# Patient Record
Sex: MALE | Race: WHITE | NOT HISPANIC OR LATINO | Employment: FULL TIME | URBAN - METROPOLITAN AREA
[De-identification: names, ages, dates, MRNs, and addresses within clinical notes are randomized per-mention and may not be internally consistent; named-entity substitution may affect disease eponyms.]

---

## 2021-04-27 ENCOUNTER — OCCUPATIONAL HEALTH (OUTPATIENT)
Dept: URGENT CARE | Facility: CLINIC | Age: 31
End: 2021-04-27

## 2021-04-27 DIAGNOSIS — Z02.83 ENCOUNTER FOR DRUG SCREENING: ICD-10-CM

## 2021-04-27 DIAGNOSIS — Z02.1 PRE-EMPLOYMENT EXAMINATION: Primary | ICD-10-CM

## 2021-04-27 PROCEDURE — 99499 PHYSICAL, BASIC COMPLEXITY: ICD-10-PCS | Mod: S$GLB,,, | Performed by: NURSE PRACTITIONER

## 2021-04-27 PROCEDURE — 99199 OCC MED MRO FEE: ICD-10-PCS | Mod: S$GLB,,, | Performed by: NURSE PRACTITIONER

## 2021-04-27 PROCEDURE — 99499 UNLISTED E&M SERVICE: CPT | Mod: S$GLB,,, | Performed by: NURSE PRACTITIONER

## 2021-04-27 PROCEDURE — 99199 UNLISTED SPECIAL SVC PX/RPRT: CPT | Mod: S$GLB,,, | Performed by: NURSE PRACTITIONER

## 2021-04-27 PROCEDURE — 80305 OOH COLLECTION ONLY DRUG SCREEN: ICD-10-PCS | Mod: S$GLB,,, | Performed by: NURSE PRACTITIONER

## 2021-04-27 PROCEDURE — 80305 DRUG TEST PRSMV DIR OPT OBS: CPT | Mod: S$GLB,,, | Performed by: NURSE PRACTITIONER

## 2024-04-09 NOTE — PROGRESS NOTES
This note was created by combination of typed  and M-Modal dictation.  Transcription errors may be present.  If there are any questions, please contact me.    Assessment and Plan:   Assessment and Plan   Normal physical exam  Need for hepatitis C screening test  Encounter for screening for HIV  -nonfasting, return for fasting labs  Average risk colon cancer screening  -     CBC Auto Differential; Future; Expected date: 04/10/2024  -     Comprehensive Metabolic Panel; Future; Expected date: 04/10/2024  -     Lipid Panel; Future; Expected date: 04/10/2024  -     Hemoglobin A1C; Future; Expected date: 04/10/2024  -     TSH; Future; Expected date: 04/10/2024  -     HIV 1/2 Ag/Ab (4th Gen); Future; Expected date: 04/10/2024  -     Hepatitis C Antibody; Future; Expected date: 04/10/2024  -     CBC Without Differential; Future; Expected date: 04/09/2025  -     Comprehensive Metabolic Panel; Future; Expected date: 04/09/2025  -     Lipid Panel; Future; Expected date: 04/09/2025    Anxiety  -worrying too much about different things, feels like he is over thinking things, makes him nauseated.  Recalls a history of medication with efficacy but can not recall its name.  Will try him with SSRI, trial of low-dose sertraline and titrate up.  Side effect profile discussed at length.  -     sertraline (ZOLOFT) 25 MG tablet; Take 1 tablet (25 mg total) by mouth once daily.  Dispense: 30 tablet; Refill: 1     There are no discontinued medications.    meds sent this encounter:  Medications Ordered This Encounter   Medications    sertraline (ZOLOFT) 25 MG tablet     Sig: Take 1 tablet (25 mg total) by mouth once daily.     Dispense:  30 tablet     Refill:  1         Follow Up:  Follow-up 1 month on new start sertraline  Future Appointments   Date Time Provider Department Center   4/16/2024  8:30 AM [facility]   5/30/2024 10:40 AM Keyshawn Mitchell MD [facility] [place]            Subjective:   Subjective    Chief Complaint   Patient presents with    [facility]       HPI  Mao is a 33 y.o. male.     Social History     Tobacco Use    Smoking status: Former    Smokeless tobacco: Not on file   Substance Use Topics    Alcohol use: Not Currently     Alcohol/week: 3.0 standard drinks of alcohol     Types: 3 Cans of beer per week          Social History     Social History Narrative    Not on file       Last appointment with this clinic was Visit date not found. Last visit with me Visit date not found   To summarize last visit and events leading up to today:  New to primary care    Today's visit:  History of anxiety  Used to take medication years ago, cannot recall the name but was taking it for about 2 years.  Was prescribed by primary care. Did find it helpful but had SE of nausea.   Symptoms - over thinking things.  Hard to fall asleep due to that.  Causing dry heaving in the mornings  Wife had mentioned counseling but he had never though much about it.  Worsening of late.   Possibly cough as a nervous tic  He is interested in restarting medication management for this.  I listed multiple brand name and generic names of multiple SSRIs and SNRIs and he has no recognition of any, possibly sertraline or fluoxetine but also possibly bupropion    Otherwise no known chronic health conditions.    Allergic rhinitis, claritin PRN. Cutting grass.      Wife and 3 YO son at home    Father with anxiety and sister as well.     Physically active with work  Diet - generally avoids sweetened drinks.    Patient Care Team:  No, Primary Doctor as PCP - General    There are no problems to display for this patient.      PAST MEDICAL PROBLEMS, PAST SURGICAL HISTORY: please see relevant portions of the electronic medical record    Family History   Problem Relation Age of Onset    No Known Problems Mother     Anxiety disorder Father     Anxiety disorder Sister     No Known Problems Son          ALLERGIES AND MEDICATIONS: updated and  reviewed.     Review of Systems   Constitutional:  Negative for fever, malaise/fatigue and weight loss.   HENT:  Negative for congestion.    Eyes:  Negative for blurred vision and pain.   Respiratory:  Negative for shortness of breath and wheezing.    Cardiovascular:  Negative for chest pain, palpitations and leg swelling.   Gastrointestinal:  Negative for abdominal pain, blood in stool, constipation, diarrhea and heartburn.   Genitourinary:  Negative for dysuria, hematuria and urgency.   Musculoskeletal:  Negative for joint pain.   Neurological:  Negative for tingling, focal weakness, weakness and headaches.   Psychiatric/Behavioral:  The patient is nervous/anxious.           Objective:   Objective   Physical Exam   Vitals:    04/10/24 1027   BP: 118/72   Pulse: 108   Temp: 98.1 °F (36.7 °C)   TempSrc: Oral   SpO2: 98%   Weight: 97.6 kg (215 lb 0.9 oz)    There is no height or weight on file to calculate BMI.            Physical Exam  Constitutional:       Appearance: Normal appearance. He is well-developed.   HENT:      Right Ear: Tympanic membrane and external ear normal.      Left Ear: Tympanic membrane and external ear normal.      Nose: Nose normal.   Eyes:      General: No scleral icterus.     Conjunctiva/sclera: Conjunctivae normal.   Neck:      Thyroid: No thyroid mass or thyromegaly.      Trachea: Trachea normal.   Cardiovascular:      Rate and Rhythm: Normal rate and regular rhythm.      Heart sounds: Normal heart sounds, S1 normal and S2 normal. No murmur heard.  Pulmonary:      Effort: Pulmonary effort is normal.      Breath sounds: Normal breath sounds.   Abdominal:      General: There is no distension.      Palpations: Abdomen is soft. There is no hepatomegaly, splenomegaly or mass.      Tenderness: There is no abdominal tenderness.   Musculoskeletal:         General: No deformity.      Right lower leg: No edema.      Left lower leg: No edema.   Lymphadenopathy:      Cervical: No cervical adenopathy.    Skin:     General: Skin is warm and dry.      Findings: No rash (on exposed skin).      Comments: On exposed skin   Neurological:      Mental Status: He is alert and oriented to person, place, and time.      Cranial Nerves: No cranial nerve deficit.      Sensory: No sensory deficit.      Deep Tendon Reflexes: Reflexes are normal and symmetric.   Psychiatric:         Speech: Speech normal.         Behavior: Behavior normal.         Thought Content: Thought content normal.         Judgment: Judgment normal.

## 2024-04-10 ENCOUNTER — OFFICE VISIT (OUTPATIENT)
Dept: FAMILY MEDICINE | Facility: CLINIC | Age: 34
End: 2024-04-10
Payer: COMMERCIAL

## 2024-04-10 VITALS
OXYGEN SATURATION: 98 % | DIASTOLIC BLOOD PRESSURE: 72 MMHG | SYSTOLIC BLOOD PRESSURE: 118 MMHG | WEIGHT: 215.06 LBS | TEMPERATURE: 98 F | HEART RATE: 108 BPM

## 2024-04-10 DIAGNOSIS — Z11.59 NEED FOR HEPATITIS C SCREENING TEST: ICD-10-CM

## 2024-04-10 DIAGNOSIS — Z00.00 NORMAL PHYSICAL EXAM: Primary | ICD-10-CM

## 2024-04-10 DIAGNOSIS — Z11.4 ENCOUNTER FOR SCREENING FOR HIV: ICD-10-CM

## 2024-04-10 DIAGNOSIS — F41.9 ANXIETY: ICD-10-CM

## 2024-04-10 PROCEDURE — 1160F RVW MEDS BY RX/DR IN RCRD: CPT | Mod: CPTII,S$GLB,, | Performed by: INTERNAL MEDICINE

## 2024-04-10 PROCEDURE — 3074F SYST BP LT 130 MM HG: CPT | Mod: CPTII,S$GLB,, | Performed by: INTERNAL MEDICINE

## 2024-04-10 PROCEDURE — 3078F DIAST BP <80 MM HG: CPT | Mod: CPTII,S$GLB,, | Performed by: INTERNAL MEDICINE

## 2024-04-10 PROCEDURE — 99999 PR PBB SHADOW E&M-EST. PATIENT-LVL III: CPT | Mod: PBBFAC,,, | Performed by: INTERNAL MEDICINE

## 2024-04-10 PROCEDURE — 1159F MED LIST DOCD IN RCRD: CPT | Mod: CPTII,S$GLB,, | Performed by: INTERNAL MEDICINE

## 2024-04-10 PROCEDURE — 99385 PREV VISIT NEW AGE 18-39: CPT | Mod: S$GLB,,, | Performed by: INTERNAL MEDICINE

## 2024-04-10 RX ORDER — SERTRALINE HYDROCHLORIDE 25 MG/1
25 TABLET, FILM COATED ORAL DAILY
Qty: 30 TABLET | Refills: 1 | Status: SHIPPED | OUTPATIENT
Start: 2024-04-10 | End: 2024-05-16

## 2024-04-12 ENCOUNTER — PATIENT OUTREACH (OUTPATIENT)
Dept: ADMINISTRATIVE | Facility: HOSPITAL | Age: 34
End: 2024-04-12
Payer: COMMERCIAL

## 2024-04-16 ENCOUNTER — LAB VISIT (OUTPATIENT)
Dept: LAB | Facility: HOSPITAL | Age: 34
End: 2024-04-16
Attending: INTERNAL MEDICINE
Payer: COMMERCIAL

## 2024-04-16 DIAGNOSIS — Z11.4 ENCOUNTER FOR SCREENING FOR HIV: ICD-10-CM

## 2024-04-16 DIAGNOSIS — Z11.59 NEED FOR HEPATITIS C SCREENING TEST: ICD-10-CM

## 2024-04-16 DIAGNOSIS — Z00.00 NORMAL PHYSICAL EXAM: ICD-10-CM

## 2024-04-16 LAB
ALBUMIN SERPL BCP-MCNC: 4.4 G/DL (ref 3.5–5.2)
ALP SERPL-CCNC: 89 U/L (ref 55–135)
ALT SERPL W/O P-5'-P-CCNC: 36 U/L (ref 10–44)
ANION GAP SERPL CALC-SCNC: 11 MMOL/L (ref 8–16)
AST SERPL-CCNC: 24 U/L (ref 10–40)
BILIRUB SERPL-MCNC: 0.7 MG/DL (ref 0.1–1)
BUN SERPL-MCNC: 23 MG/DL (ref 6–20)
CALCIUM SERPL-MCNC: 9.8 MG/DL (ref 8.7–10.5)
CHLORIDE SERPL-SCNC: 107 MMOL/L (ref 95–110)
CHOLEST SERPL-MCNC: 250 MG/DL (ref 120–199)
CHOLEST/HDLC SERPL: 5.8 {RATIO} (ref 2–5)
CO2 SERPL-SCNC: 20 MMOL/L (ref 23–29)
CREAT SERPL-MCNC: 1 MG/DL (ref 0.5–1.4)
ERYTHROCYTE [DISTWIDTH] IN BLOOD BY AUTOMATED COUNT: 12.6 % (ref 11.5–14.5)
EST. GFR  (NO RACE VARIABLE): >60 ML/MIN/1.73 M^2
ESTIMATED AVG GLUCOSE: 103 MG/DL (ref 68–131)
GLUCOSE SERPL-MCNC: 90 MG/DL (ref 70–110)
HBA1C MFR BLD: 5.2 % (ref 4–5.6)
HCT VFR BLD AUTO: 52.6 % (ref 40–54)
HCV AB SERPL QL IA: NORMAL
HDLC SERPL-MCNC: 43 MG/DL (ref 40–75)
HDLC SERPL: 17.2 % (ref 20–50)
HGB BLD-MCNC: 17.2 G/DL (ref 14–18)
HIV 1+2 AB+HIV1 P24 AG SERPL QL IA: NORMAL
LDLC SERPL CALC-MCNC: 187.2 MG/DL (ref 63–159)
MCH RBC QN AUTO: 28.5 PG (ref 27–31)
MCHC RBC AUTO-ENTMCNC: 32.7 G/DL (ref 32–36)
MCV RBC AUTO: 87 FL (ref 82–98)
NONHDLC SERPL-MCNC: 207 MG/DL
PLATELET # BLD AUTO: 349 K/UL (ref 150–450)
PMV BLD AUTO: 9.2 FL (ref 9.2–12.9)
POTASSIUM SERPL-SCNC: 4 MMOL/L (ref 3.5–5.1)
PROT SERPL-MCNC: 8.2 G/DL (ref 6–8.4)
RBC # BLD AUTO: 6.03 M/UL (ref 4.6–6.2)
SODIUM SERPL-SCNC: 138 MMOL/L (ref 136–145)
TRIGL SERPL-MCNC: 99 MG/DL (ref 30–150)
TSH SERPL DL<=0.005 MIU/L-ACNC: 1.16 UIU/ML (ref 0.4–4)
WBC # BLD AUTO: 7.43 K/UL (ref 3.9–12.7)

## 2024-04-16 PROCEDURE — 87389 HIV-1 AG W/HIV-1&-2 AB AG IA: CPT | Performed by: INTERNAL MEDICINE

## 2024-04-16 PROCEDURE — 80053 COMPREHEN METABOLIC PANEL: CPT | Performed by: INTERNAL MEDICINE

## 2024-04-16 PROCEDURE — 80061 LIPID PANEL: CPT | Performed by: INTERNAL MEDICINE

## 2024-04-16 PROCEDURE — 84443 ASSAY THYROID STIM HORMONE: CPT | Performed by: INTERNAL MEDICINE

## 2024-04-16 PROCEDURE — 86803 HEPATITIS C AB TEST: CPT | Performed by: INTERNAL MEDICINE

## 2024-04-16 PROCEDURE — 36415 COLL VENOUS BLD VENIPUNCTURE: CPT | Mod: PO | Performed by: INTERNAL MEDICINE

## 2024-04-16 PROCEDURE — 83036 HEMOGLOBIN GLYCOSYLATED A1C: CPT | Performed by: INTERNAL MEDICINE

## 2024-04-16 PROCEDURE — 85027 COMPLETE CBC AUTOMATED: CPT | Performed by: INTERNAL MEDICINE

## 2024-04-17 NOTE — PROGRESS NOTES
Lipid high no previous for comparison. LDL just under 190, nonHDL 207. Not on statin  CMP WNL  CBC WNL  TSH WNL  A1c WNL  HIV, HCV negative  Results to pt. Work on therapeutic lifestyle modification (TLC). OV 1 month on SSRI

## 2024-04-30 ENCOUNTER — OCCUPATIONAL HEALTH (OUTPATIENT)
Dept: URGENT CARE | Facility: CLINIC | Age: 34
End: 2024-04-30

## 2024-04-30 DIAGNOSIS — Z13.9 ENCOUNTER FOR SCREENING: Primary | ICD-10-CM

## 2024-04-30 LAB — BREATH ALCOHOL: 0

## 2024-04-30 PROCEDURE — 80305 DRUG TEST PRSMV DIR OPT OBS: CPT | Mod: S$GLB,,, | Performed by: PHYSICIAN ASSISTANT

## 2024-04-30 PROCEDURE — 99499 UNLISTED E&M SERVICE: CPT | Mod: S$GLB,,, | Performed by: PHYSICIAN ASSISTANT

## 2024-04-30 PROCEDURE — 82075 ASSAY OF BREATH ETHANOL: CPT | Mod: S$GLB,,, | Performed by: PHYSICIAN ASSISTANT

## 2024-05-15 NOTE — PROGRESS NOTES
This note was created by combination of typed  and M-Modal dictation.  Transcription errors may be present.  If there are any questions, please contact me.    Assessment and Plan:   Assessment and Plan   Anxiety  -improved on new start sertraline but not to goal.  Increase to 50 mg.    Postdated PHQ-9 and chris 7 questionnaires to be sent to the patient in 1 month's time   Plan for follow-up in 3 months.  If high scores on the PHQ-9 and chris 7, can increase to 100 mg in the interval  -     sertraline (ZOLOFT) 50 MG tablet; Take 1 tablet (50 mg total) by mouth once daily.  Dispense: 30 tablet; Refill: 5             Medications Discontinued During This Encounter   Medication Reason    sertraline (ZOLOFT) 25 MG tablet        meds sent this encounter:  Medications Ordered This Encounter   Medications    sertraline (ZOLOFT) 50 MG tablet     Sig: Take 1 tablet (50 mg total) by mouth once daily.     Dispense:  30 tablet     Refill:  5         Follow Up:  Questionnaires back to me in 1 month, virtual visit 3 months  No future appointments.         Subjective:   Subjective   Chief Complaint   Patient presents with    Follow-up       HPI  Mao is a 33 y.o. male.     Social History     Tobacco Use    Smoking status: Former     Current packs/day: 0.00     Average packs/day: 0.3 packs/day for 12.7 years (3.8 ttl pk-yrs)     Types: Cigarettes     Start date:      Quit date: 10/2022     Years since quittin.6    Smokeless tobacco: Never   Substance Use Topics    Alcohol use: Not Currently     Alcohol/week: 3.0 standard drinks of alcohol     Types: 3 Cans of beer per week      Social History     Occupational History    Occupation: [employer] de[employer] x 2024      Social History     Social History Narrative    , 3 YO son (as of 2024)     The chief complaint leading to consultation is: anxiety follow up   Visit type: Virtual visit with synchronous audio and video  Total time spent with patient: 15  minutes  Each patient to whom he or she provides medical services by telemedicine is:  (1) informed of the relationship between the physician and patient and the respective role of any other health care provider with respect to management of the patient; and (2) notified that he or she may decline to receive medical services by telemedicine and may withdraw from such care at any time.    Notes:  Last appointment with this clinic was 4/10/2024. Last visit with me 4/10/2024   To summarize last visit and events leading up to today:  Saw him for first time 4/10/24 for Physical examination.  Anxiety trial sertraline  Symptoms - over thinking things.  Hard to fall asleep due to that.  Causing dry heaving in the mornings  Wife had mentioned counseling but he had never though much about it.    Lipid high no previous for comparison. LDL just under 190, nonHDL 207. Not on statin  CMP WNL  CBC WNL  TSH WNL  A1c WNL  HIV, HCV negative  Results to pt. Work on therapeutic lifestyle modification (TLC). OV 1 month on SSRI        Today's visit:    Tolerating the new start sertraline.  Denies obvious side effects of the medication.    Feels like it does help with the anxiety but feels like control is insufficient.    Still gets panic episodes    Due to start a new job on Monday. 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 but this is a new company    Answers submitted by the patient for this visit:  Review of Systems Questionnaire (Submitted on 5/16/2024)  activity change: No  unexpected weight change: No  neck pain: No  hearing loss: No  rhinorrhea: No  trouble swallowing: No  eye discharge: No  visual disturbance: No  chest tightness: No  wheezing: No  chest pain: No  palpitations: No  blood in stool: No  constipation: No  vomiting: No  diarrhea: No  polydipsia: No  polyuria: No  difficulty urinating: No  urgency: No  hematuria: No  joint swelling:  "No  arthralgias: No  headaches: No  weakness: No  confusion: No  dysphoric mood: No      Patient Care Team:  Keyshawn Mitchell MD as PCP - General (Internal Medicine)    Patient Active Problem List    Diagnosis Date Noted    Anxiety 04/10/2024       PAST MEDICAL PROBLEMS, PAST SURGICAL HISTORY: please see relevant portions of the electronic medical record    ALLERGIES AND MEDICATIONS: updated and reviewed.  Medication List with Changes/Refills   Current Medications    SERTRALINE (ZOLOFT) 25 MG TABLET    Take 1 tablet (25 mg total) by mouth once daily.      Review of Systems   HENT:  Negative for hearing loss.    Eyes:  Negative for discharge.   Respiratory:  Negative for wheezing.    Cardiovascular:  Negative for chest pain and palpitations.   Gastrointestinal:  Negative for blood in stool, constipation, diarrhea and vomiting.   Genitourinary:  Negative for hematuria and urgency.   Musculoskeletal:  Negative for neck pain.   Neurological:  Negative for weakness and headaches.   Endo/Heme/Allergies:  Negative for polydipsia.          Objective:   Objective   Physical Exam   Vitals:    05/16/24 1300   Weight: 97.5 kg (215 lb)   Height: 5' 11" (1.803 m)    Body mass index is 29.99 kg/m².  Weight: 97.5 kg (215 lb)   Height: 5' 11" (180.3 cm)     Physical Exam  Constitutional:       General: He is not in acute distress.     Appearance: Normal appearance. He is not ill-appearing.   HENT:      Head: Normocephalic.   Pulmonary:      Effort: Pulmonary effort is normal.   Neurological:      General: No focal deficit present.      Mental Status: He is alert.   Psychiatric:         Mood and Affect: Mood normal.         Behavior: Behavior normal.         Thought Content: Thought content normal.         Judgment: Judgment normal.                "

## 2024-05-16 ENCOUNTER — OFFICE VISIT (OUTPATIENT)
Dept: FAMILY MEDICINE | Facility: CLINIC | Age: 34
End: 2024-05-16
Payer: COMMERCIAL

## 2024-05-16 ENCOUNTER — PATIENT MESSAGE (OUTPATIENT)
Dept: FAMILY MEDICINE | Facility: CLINIC | Age: 34
End: 2024-05-16

## 2024-05-16 VITALS — HEIGHT: 71 IN | WEIGHT: 215 LBS | BODY MASS INDEX: 30.1 KG/M2

## 2024-05-16 DIAGNOSIS — F41.9 ANXIETY: Primary | ICD-10-CM

## 2024-05-16 PROCEDURE — 3008F BODY MASS INDEX DOCD: CPT | Mod: CPTII,95,, | Performed by: INTERNAL MEDICINE

## 2024-05-16 PROCEDURE — 1159F MED LIST DOCD IN RCRD: CPT | Mod: CPTII,95,, | Performed by: INTERNAL MEDICINE

## 2024-05-16 PROCEDURE — 3044F HG A1C LEVEL LT 7.0%: CPT | Mod: CPTII,95,, | Performed by: INTERNAL MEDICINE

## 2024-05-16 PROCEDURE — 99213 OFFICE O/P EST LOW 20 MIN: CPT | Mod: 95,,, | Performed by: INTERNAL MEDICINE

## 2024-05-16 PROCEDURE — 1160F RVW MEDS BY RX/DR IN RCRD: CPT | Mod: CPTII,95,, | Performed by: INTERNAL MEDICINE

## 2024-05-16 RX ORDER — SERTRALINE HYDROCHLORIDE 50 MG/1
50 TABLET, FILM COATED ORAL DAILY
Qty: 30 TABLET | Refills: 5 | Status: SHIPPED | OUTPATIENT
Start: 2024-05-16 | End: 2025-05-16

## 2024-06-15 ENCOUNTER — OFFICE VISIT (OUTPATIENT)
Dept: URGENT CARE | Facility: CLINIC | Age: 34
End: 2024-06-15
Payer: COMMERCIAL

## 2024-06-15 VITALS
TEMPERATURE: 99 F | BODY MASS INDEX: 30.24 KG/M2 | WEIGHT: 216 LBS | HEART RATE: 86 BPM | HEIGHT: 71 IN | SYSTOLIC BLOOD PRESSURE: 132 MMHG | RESPIRATION RATE: 16 BRPM | OXYGEN SATURATION: 96 % | DIASTOLIC BLOOD PRESSURE: 86 MMHG

## 2024-06-15 DIAGNOSIS — L23.9 ALLERGIC CONTACT DERMATITIS, UNSPECIFIED TRIGGER: Primary | ICD-10-CM

## 2024-06-15 PROCEDURE — 96372 THER/PROPH/DIAG INJ SC/IM: CPT | Mod: S$GLB,,,

## 2024-06-15 PROCEDURE — 99213 OFFICE O/P EST LOW 20 MIN: CPT | Mod: 25,S$GLB,,

## 2024-06-15 RX ORDER — CETIRIZINE HYDROCHLORIDE 10 MG/1
10 TABLET ORAL DAILY
Qty: 7 TABLET | Refills: 0 | Status: SHIPPED | OUTPATIENT
Start: 2024-06-15 | End: 2024-06-22

## 2024-06-15 RX ORDER — FAMOTIDINE 20 MG/1
20 TABLET, FILM COATED ORAL 2 TIMES DAILY
Qty: 14 TABLET | Refills: 0 | Status: SHIPPED | OUTPATIENT
Start: 2024-06-15 | End: 2024-06-22

## 2024-06-15 RX ORDER — DEXAMETHASONE SODIUM PHOSPHATE 100 MG/10ML
10 INJECTION INTRAMUSCULAR; INTRAVENOUS
Status: COMPLETED | OUTPATIENT
Start: 2024-06-15 | End: 2024-06-15

## 2024-06-15 RX ADMIN — DEXAMETHASONE SODIUM PHOSPHATE 10 MG: 100 INJECTION INTRAMUSCULAR; INTRAVENOUS at 10:06

## 2024-06-15 NOTE — PROGRESS NOTES
"Subjective:      Patient ID: Mao Romero Jr. is a 33 y.o. male.    Vitals:  height is 5' 11" (1.803 m) and weight is 98 kg (216 lb). His oral temperature is 98.5 °F (36.9 °C). His blood pressure is 132/86 and his pulse is 86. His respiration is 16 and oxygen saturation is 96%.     Chief Complaint: Rash    Pt present for a rash appeared 2 days ago. Pt took benadryl and used anti-itch cream.     Provider note starts below:  Patient presents to clinic for evaluation of rash to bilateral legs and arms.  States he first noticed a rash about 2 days ago.  Rash is very itchy, but not painful.  Patient denies any rapid spreading of the rash.  He has been taking Benadryl at night and using over-the-counter anti-itch cream.  States these treatments have improved the itching.  He has also been taking Aveeno oatmeal baths which have helped.  Patient is unsure what caused the rash.  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  He denies any new medications.  He denies any new soaps or body products. Patient does believe his wife has started using a new laundry detergent.  States she usually uses a free and clear detergent.  Patient denies any difficulty breathing, shortness for breath, cough, chest tightness, wheezing, tongue/lip swelling, facial swelling, eyelid swelling, fever, chills, nausea, vomiting, dizziness, lightheadedness, headache.  No other complaints.    Rash  This is a new problem. The current episode started in the past 7 days. The problem is unchanged. The affected locations include the left upper leg, left lower leg, right upper leg and right lower leg. The rash is characterized by itchiness and redness. He was exposed to nothing. Pertinent negatives include no cough, fever, shortness of breath, sore throat or vomiting. Past treatments include anti-itch cream. The treatment provided no relief.     Constitution: Negative for chills and fever.   HENT:  Negative " for tongue pain, facial swelling, sore throat and trouble swallowing.    Neck: Negative for neck pain and neck stiffness.   Cardiovascular:  Negative for chest pain, leg swelling, palpitations and sob on exertion.   Eyes:  Negative for eye discharge, eye itching and eyelid swelling.   Respiratory:  Negative for chest tightness, cough, shortness of breath, stridor and wheezing.    Gastrointestinal:  Negative for nausea and vomiting.   Musculoskeletal:  Negative for muscle cramps and muscle ache.   Skin:  Positive for rash. Negative for pale, wound, abrasion and laceration.   Allergic/Immunologic: Positive for itching. Negative for sneezing.   Neurological:  Negative for dizziness, light-headedness, headaches, disorientation, altered mental status, numbness and tingling.   Psychiatric/Behavioral:  Negative for altered mental status, disorientation and confusion.       Objective:     Physical Exam   Constitutional: He is oriented to person, place, and time.  Non-toxic appearance. He does not appear ill. No distress.   HENT:   Head: Normocephalic and atraumatic.   Mouth/Throat: Mucous membranes are moist. Oropharynx is clear.   Eyes: Conjunctivae are normal. Extraocular movement intact   Neck: Neck supple.   Cardiovascular: Normal rate, regular rhythm, normal heart sounds and normal pulses.   Pulmonary/Chest: Effort normal and breath sounds normal. No stridor. No respiratory distress. He has no wheezes. He has no rhonchi. He has no rales.   Abdominal: Normal appearance.   Musculoskeletal: Normal range of motion.         General: Normal range of motion.   Neurological: He is alert, oriented to person, place, and time and at baseline.   Skin: Skin is warm and dry.         Comments: Erythematous, extremely pruritic macules and papules to bilateral legs and arms. No signs of secondary bacterial infection. No excoriations. No open wounds. No tenderness or warmth over rash.      Psychiatric: His behavior is normal. Mood  normal.   Nursing note and vitals reviewed.      Assessment:     1. Allergic contact dermatitis, unspecified trigger        Plan:     Allergic contact dermatitis, unspecified trigger  -     dexAMETHasone injection 10 mg  -     famotidine (PEPCID) 20 MG tablet; Take 1 tablet (20 mg total) by mouth 2 (two) times daily. for 7 days  Dispense: 14 tablet; Refill: 0  -     cetirizine (ZYRTEC) 10 MG tablet; Take 1 tablet (10 mg total) by mouth once daily. for 7 days  Dispense: 7 tablet; Refill: 0            Patient Instructions   Contact dermatitis is the medical name for a kind of skin rash. You get this when your skin touches something that bothers it.  Many things can cause your rash. You may have a rash if something is irritating your skin. An allergy can cause a rash and so can plants, soaps, and some kinds of metal. Treatment is to avoid the items that are causing problems.    Treatment   Steroid injection to help decrease inflammation  This can elevate your blood pressure, elevate your blood sugar, cause weight gain, nervous energy, redness to the face and dimpling of the skin where the injection was administered.  Zyrtec 10 mg and Pepcid 20 mg daily in the morning and benadryl at night. These are antihistamine medications to help with itching.   You can continue over the counter anti-itch cream as needed for itching.     Care at home  Use an unscented cream or lotion to keep your skin moist. I recommend Aquaphor, Vaseline, or Aveeno.   Drink plenty of fluids to keep your body hydrated.  Bathe with cool or warm water. Do not use hot water. Pat yourself dry with a clean, thick, soft towel. Use mild and unscented soap, moisturizers, and deodorants.  At-home care to help with scratching:  Wear gloves to protect skin on your hands. Try wearing cotton gloves under plastic gloves. Remove both sets of gloves from time to time to prevent sweating.  Keep nails short and clean.  If you scratch in your sleep, wear white cotton  gloves to bed.  Try using cool compresses on the skin. They may help with swelling and itching. Dip a cloth in cold water and put it right on your itchy skin.      Go to the ED if  You start to have severe trouble breathing or swallowing (for example, you cannot speak in full sentences).  The rash spreads over large parts of your body and most of your skin becomes red.  It is becoming hard to breathe, but you can still talk in full sentences.  You have a fever of 100.4°F (38°C) or higher or chills.  You have signs of a wound infection like swelling, redness, warmth, pain, or drainage from the wound.    Should you develop any worsening or new symptoms after leaving urgent care, it is recommended that you go to the ER for further/repeat evaluation.      Follow up with your PCP in 3-5 days after your urgent care visit.     Please remember that you have received care at an urgent care today. Urgent cares are not emergency rooms and are not equipped to handle life threatening emergencies and cannot rule in or out certain medical conditions and you may be released before all of your medical problems are known or treated, please schedule all follow up appointments as discussed and if you have worsening symptoms please go to the ER to rule out potential life threatening problems, as discussed.

## 2024-06-15 NOTE — PATIENT INSTRUCTIONS
Contact dermatitis is the medical name for a kind of skin rash. You get this when your skin touches something that bothers it.  Many things can cause your rash. You may have a rash if something is irritating your skin. An allergy can cause a rash and so can plants, soaps, and some kinds of metal. Treatment is to avoid the items that are causing problems.    Treatment   Steroid injection to help decrease inflammation  This can elevate your blood pressure, elevate your blood sugar, cause weight gain, nervous energy, redness to the face and dimpling of the skin where the injection was administered.  Zyrtec 10 mg and Pepcid 20 mg daily in the morning and benadryl at night. These are antihistamine medications to help with itching.   You can continue over the counter anti-itch cream as needed for itching.     Care at home  Use an unscented cream or lotion to keep your skin moist. I recommend Aquaphor, Vaseline, or Aveeno.   Drink plenty of fluids to keep your body hydrated.  Bathe with cool or warm water. Do not use hot water. Pat yourself dry with a clean, thick, soft towel. Use mild and unscented soap, moisturizers, and deodorants.  At-home care to help with scratching:  Wear gloves to protect skin on your hands. Try wearing cotton gloves under plastic gloves. Remove both sets of gloves from time to time to prevent sweating.  Keep nails short and clean.  If you scratch in your sleep, wear white cotton gloves to bed.  Try using cool compresses on the skin. They may help with swelling and itching. Dip a cloth in cold water and put it right on your itchy skin.      Go to the ED if  You start to have severe trouble breathing or swallowing (for example, you cannot speak in full sentences).  The rash spreads over large parts of your body and most of your skin becomes red.  It is becoming hard to breathe, but you can still talk in full sentences.  You have a fever of 100.4°F (38°C) or higher or chills.  You have signs of a  wound infection like swelling, redness, warmth, pain, or drainage from the wound.    Should you develop any worsening or new symptoms after leaving urgent care, it is recommended that you go to the ER for further/repeat evaluation.      Follow up with your PCP in 3-5 days after your urgent care visit.     Please remember that you have received care at an urgent care today. Urgent cares are not emergency rooms and are not equipped to handle life threatening emergencies and cannot rule in or out certain medical conditions and you may be released before all of your medical problems are known or treated, please schedule all follow up appointments as discussed and if you have worsening symptoms please go to the ER to rule out potential life threatening problems, as discussed.

## 2024-06-16 NOTE — TELEPHONE ENCOUNTER
6/16/2024     9:26 AM 4/10/2024    10:30 AM   PHQ-9 Depression Patient Health Questionnaire   Over the last two weeks how often have you been bothered by little interest or pleasure in doing things 0 0   Over the last two weeks how often have you been bothered by feeling down, depressed or hopeless 0 0   Over the last two weeks how often have you been bothered by trouble falling or staying asleep, or sleeping too much 0    Over the last two weeks how often have you been bothered by feeling tired or having little energy 0    Over the last two weeks how often have you been bothered by a poor appetite or overeating 0    Over the last two weeks how often have you been bothered by feeling bad about yourself - or that you are a failure or have let yourself or your family down 0    Over the last two weeks how often have you been bothered by trouble concentrating on things, such as reading the newspaper or watching television 0    Over the last two weeks how often have you been bothered by moving or speaking so slowly that other people could have noticed. 0    Over the last two weeks how often have you been bothered by thoughts that you would be better off dead, or of hurting yourself 0    PHQ-9 Score 0       GAD7 0

## 2024-12-05 DIAGNOSIS — F41.9 ANXIETY: ICD-10-CM

## 2024-12-05 RX ORDER — SERTRALINE HYDROCHLORIDE 50 MG/1
50 TABLET, FILM COATED ORAL
Qty: 90 TABLET | Refills: 1 | Status: SHIPPED | OUTPATIENT
Start: 2024-12-05

## 2024-12-05 NOTE — TELEPHONE ENCOUNTER
No care due was identified.  [facility] Embedded Care Due Messages. Reference number: [id number].   12/05/2024 12:16:52 AM CST

## 2024-12-05 NOTE — TELEPHONE ENCOUNTER
Refill Decision Note   Mao Heather  is requesting a refill authorization.  Brief Assessment and Rationale for Refill:  Approve     Medication Therapy Plan:         Comments:     Note composed:6:21 AM 12/05/2024

## 2025-04-03 ENCOUNTER — OCHSNER VIRTUAL EMERGENCY DEPARTMENT (OUTPATIENT)
Facility: CLINIC | Age: 35
End: 2025-04-03
Payer: COMMERCIAL

## 2025-04-03 ENCOUNTER — PATIENT OUTREACH (OUTPATIENT)
Facility: OTHER | Age: 35
End: 2025-04-03
Payer: COMMERCIAL

## 2025-04-03 ENCOUNTER — OFFICE VISIT (OUTPATIENT)
Dept: URGENT CARE | Facility: CLINIC | Age: 35
End: 2025-04-03
Payer: COMMERCIAL

## 2025-04-03 ENCOUNTER — HOSPITAL ENCOUNTER (INPATIENT)
Facility: HOSPITAL | Age: 35
LOS: 3 days | Discharge: HOME | DRG: 872 | End: 2025-04-06
Attending: EMERGENCY MEDICINE
Payer: COMMERCIAL

## 2025-04-03 VITALS
DIASTOLIC BLOOD PRESSURE: 71 MMHG | WEIGHT: 216 LBS | TEMPERATURE: 102 F | BODY MASS INDEX: 30.24 KG/M2 | HEIGHT: 71 IN | HEART RATE: 122 BPM | SYSTOLIC BLOOD PRESSURE: 129 MMHG | OXYGEN SATURATION: 99 % | RESPIRATION RATE: 20 BRPM

## 2025-04-03 DIAGNOSIS — R50.9 FEVER, UNSPECIFIED FEVER CAUSE: ICD-10-CM

## 2025-04-03 DIAGNOSIS — K50.019 TERMINAL ILEITIS WITH COMPLICATION: ICD-10-CM

## 2025-04-03 DIAGNOSIS — Z13.6 SCREENING FOR CARDIOVASCULAR CONDITION: ICD-10-CM

## 2025-04-03 DIAGNOSIS — R07.9 CHEST PAIN: ICD-10-CM

## 2025-04-03 DIAGNOSIS — R10.31 RLQ ABDOMINAL PAIN: Primary | ICD-10-CM

## 2025-04-03 DIAGNOSIS — K35.30 ACUTE APPENDICITIS WITH LOCALIZED PERITONITIS WITHOUT GANGRENE, UNSPECIFIED WHETHER ABSCESS PRESENT, UNSPECIFIED WHETHER PERFORATION PRESENT: Primary | ICD-10-CM

## 2025-04-03 DIAGNOSIS — A41.9 SEPSIS WITHOUT ACUTE ORGAN DYSFUNCTION, DUE TO UNSPECIFIED ORGANISM: ICD-10-CM

## 2025-04-03 PROBLEM — K50.00 TERMINAL ILEITIS: Status: ACTIVE | Noted: 2025-04-03

## 2025-04-03 LAB
ABSOLUTE NEUTROPHIL MANUAL (OHS): 20.4 K/UL
ALBUMIN SERPL BCP-MCNC: 3.5 G/DL (ref 3.5–5.2)
ALLENS TEST: ABNORMAL
ALLENS TEST: NORMAL
ALP SERPL-CCNC: 128 UNIT/L (ref 40–150)
ALT SERPL W/O P-5'-P-CCNC: 41 UNIT/L (ref 10–44)
ANION GAP (OHS): 13 MMOL/L (ref 8–16)
ANION GAP SERPL CALC-SCNC: 17 MMOL/L (ref 8–16)
AST SERPL-CCNC: 35 UNIT/L (ref 11–45)
BACTERIA #/AREA URNS AUTO: NORMAL /HPF
BASOPHILS NFR BLD MANUAL: 1 %
BILIRUB SERPL-MCNC: 1.4 MG/DL (ref 0.1–1)
BILIRUB UR QL STRIP.AUTO: ABNORMAL
BILIRUBIN, UA POC OHS: ABNORMAL
BLOOD, UA POC OHS: ABNORMAL
BUN SERPL-MCNC: 12 MG/DL (ref 6–20)
BUN SERPL-MCNC: 13 MG/DL (ref 6–30)
CALCIUM SERPL-MCNC: 9.7 MG/DL (ref 8.7–10.5)
CHLORIDE SERPL-SCNC: 103 MMOL/L (ref 95–110)
CHLORIDE SERPL-SCNC: 105 MMOL/L (ref 95–110)
CLARITY UR: CLEAR
CLARITY, UA POC OHS: CLEAR
CO2 SERPL-SCNC: 17 MMOL/L (ref 23–29)
COLOR UR AUTO: ABNORMAL
COLOR, UA POC OHS: YELLOW
CREAT SERPL-MCNC: 0.9 MG/DL (ref 0.5–1.4)
CREAT SERPL-MCNC: 1 MG/DL (ref 0.5–1.4)
CTP QC/QA: YES
ERYTHROCYTE [DISTWIDTH] IN BLOOD BY AUTOMATED COUNT: 13 % (ref 11.5–14.5)
GFR SERPLBLD CREATININE-BSD FMLA CKD-EPI: >60 ML/MIN/1.73/M2
GLUCOSE SERPL-MCNC: 103 MG/DL (ref 70–110)
GLUCOSE SERPL-MCNC: 110 MG/DL (ref 70–110)
GLUCOSE UR QL STRIP: NEGATIVE
GLUCOSE, UA POC OHS: NEGATIVE
HCT VFR BLD AUTO: 46.9 % (ref 40–54)
HCT VFR BLD CALC: 52 %PCV (ref 36–54)
HGB BLD-MCNC: 15.7 GM/DL (ref 14–18)
HGB UR QL STRIP: ABNORMAL
HYALINE CASTS UR QL AUTO: 0 /LPF (ref 0–1)
KETONES UR QL STRIP: ABNORMAL
KETONES, UA POC OHS: 40
LACTATE SERPL-SCNC: 1.4 MMOL/L (ref 0.5–2.2)
LDH SERPL L TO P-CCNC: 1.1 MMOL/L (ref 0.5–2.2)
LEUKOCYTE ESTERASE UR QL STRIP: NEGATIVE
LEUKOCYTES, UA POC OHS: NEGATIVE
LYMPHOCYTES NFR BLD MANUAL: 13 % (ref 18–48)
MCH RBC QN AUTO: 28.9 PG (ref 27–31)
MCHC RBC AUTO-ENTMCNC: 33.5 G/DL (ref 32–36)
MCV RBC AUTO: 86 FL (ref 82–98)
MICROSCOPIC COMMENT: NORMAL
MONOCYTES NFR BLD MANUAL: 8 % (ref 4–15)
NEUTROPHILS NFR BLD MANUAL: 77 % (ref 38–73)
NEUTS BAND NFR BLD MANUAL: 1 %
NITRITE UR QL STRIP: NEGATIVE
NITRITE, UA POC OHS: NEGATIVE
NUCLEATED RBC (/100WBC) (OHS): 0 /100 WBC
PH UR STRIP: 6 [PH]
PH, UA POC OHS: 7
PLATELET # BLD AUTO: 388 K/UL (ref 150–450)
PLATELET BLD QL SMEAR: ABNORMAL
PMV BLD AUTO: 9 FL (ref 9.2–12.9)
POC IONIZED CALCIUM: 1.16 MMOL/L (ref 1.06–1.42)
POC MOLECULAR INFLUENZA A AGN: NEGATIVE
POC MOLECULAR INFLUENZA B AGN: NEGATIVE
POC TCO2 (MEASURED): 21 MMOL/L (ref 23–29)
POTASSIUM BLD-SCNC: 3.9 MMOL/L (ref 3.5–5.1)
POTASSIUM SERPL-SCNC: 3.9 MMOL/L (ref 3.5–5.1)
PROT SERPL-MCNC: 8.7 GM/DL (ref 6–8.4)
PROT UR QL STRIP: ABNORMAL
PROTEIN, UA POC OHS: 100
RBC # BLD AUTO: 5.44 M/UL (ref 4.6–6.2)
RBC #/AREA URNS AUTO: 2 /HPF (ref 0–4)
SAMPLE: ABNORMAL
SAMPLE: NORMAL
SITE: ABNORMAL
SITE: NORMAL
SODIUM BLD-SCNC: 136 MMOL/L (ref 136–145)
SODIUM SERPL-SCNC: 135 MMOL/L (ref 136–145)
SP GR UR STRIP: 1.03
SPECIFIC GRAVITY, UA POC OHS: 1.02
UROBILINOGEN UR STRIP-ACNC: >=8 EU/DL
UROBILINOGEN, UA POC OHS: >=8
WBC # BLD AUTO: 26.2 K/UL (ref 3.9–12.7)
WBC #/AREA URNS AUTO: 2 /HPF (ref 0–5)

## 2025-04-03 PROCEDURE — 93010 ELECTROCARDIOGRAM REPORT: CPT | Mod: ,,, | Performed by: INTERNAL MEDICINE

## 2025-04-03 PROCEDURE — 25000003 PHARM REV CODE 250

## 2025-04-03 PROCEDURE — 83605 ASSAY OF LACTIC ACID: CPT

## 2025-04-03 PROCEDURE — 96375 TX/PRO/DX INJ NEW DRUG ADDON: CPT

## 2025-04-03 PROCEDURE — 93005 ELECTROCARDIOGRAM TRACING: CPT

## 2025-04-03 PROCEDURE — 87502 INFLUENZA DNA AMP PROBE: CPT | Mod: QW,S$GLB,,

## 2025-04-03 PROCEDURE — 99285 EMERGENCY DEPT VISIT HI MDM: CPT | Mod: 25

## 2025-04-03 PROCEDURE — 85014 HEMATOCRIT: CPT

## 2025-04-03 PROCEDURE — 25500020 PHARM REV CODE 255: Performed by: EMERGENCY MEDICINE

## 2025-04-03 PROCEDURE — 12000002 HC ACUTE/MED SURGE SEMI-PRIVATE ROOM

## 2025-04-03 PROCEDURE — 80053 COMPREHEN METABOLIC PANEL: CPT

## 2025-04-03 PROCEDURE — 82330 ASSAY OF CALCIUM: CPT

## 2025-04-03 PROCEDURE — 87040 BLOOD CULTURE FOR BACTERIA: CPT

## 2025-04-03 PROCEDURE — 63600175 PHARM REV CODE 636 W HCPCS

## 2025-04-03 PROCEDURE — 81003 URINALYSIS AUTO W/O SCOPE: CPT | Mod: QW,S$GLB,,

## 2025-04-03 PROCEDURE — 84132 ASSAY OF SERUM POTASSIUM: CPT

## 2025-04-03 PROCEDURE — 81003 URINALYSIS AUTO W/O SCOPE: CPT

## 2025-04-03 PROCEDURE — 99215 OFFICE O/P EST HI 40 MIN: CPT | Mod: S$GLB,,,

## 2025-04-03 PROCEDURE — 84295 ASSAY OF SERUM SODIUM: CPT

## 2025-04-03 PROCEDURE — 96365 THER/PROPH/DIAG IV INF INIT: CPT

## 2025-04-03 PROCEDURE — 85007 BL SMEAR W/DIFF WBC COUNT: CPT

## 2025-04-03 PROCEDURE — 99900035 HC TECH TIME PER 15 MIN (STAT)

## 2025-04-03 PROCEDURE — 82565 ASSAY OF CREATININE: CPT

## 2025-04-03 RX ORDER — LOPERAMIDE HYDROCHLORIDE 2 MG/1
4 CAPSULE ORAL ONCE AS NEEDED
Status: DISCONTINUED | OUTPATIENT
Start: 2025-04-03 | End: 2025-04-06 | Stop reason: HOSPADM

## 2025-04-03 RX ORDER — TALC
6 POWDER (GRAM) TOPICAL NIGHTLY PRN
Status: DISCONTINUED | OUTPATIENT
Start: 2025-04-03 | End: 2025-04-06 | Stop reason: HOSPADM

## 2025-04-03 RX ORDER — SODIUM CHLORIDE 0.9 % (FLUSH) 0.9 %
10 SYRINGE (ML) INJECTION EVERY 12 HOURS PRN
Status: DISCONTINUED | OUTPATIENT
Start: 2025-04-03 | End: 2025-04-06 | Stop reason: HOSPADM

## 2025-04-03 RX ORDER — HYDRALAZINE HYDROCHLORIDE 20 MG/ML
10 INJECTION INTRAMUSCULAR; INTRAVENOUS EVERY 6 HOURS PRN
Status: DISCONTINUED | OUTPATIENT
Start: 2025-04-03 | End: 2025-04-06 | Stop reason: HOSPADM

## 2025-04-03 RX ORDER — IBUPROFEN 200 MG
16 TABLET ORAL
Status: DISCONTINUED | OUTPATIENT
Start: 2025-04-03 | End: 2025-04-06 | Stop reason: HOSPADM

## 2025-04-03 RX ORDER — GUAIFENESIN 100 MG/5ML
200 LIQUID ORAL EVERY 4 HOURS PRN
Status: DISCONTINUED | OUTPATIENT
Start: 2025-04-03 | End: 2025-04-06 | Stop reason: HOSPADM

## 2025-04-03 RX ORDER — IBUPROFEN 200 MG
24 TABLET ORAL
Status: DISCONTINUED | OUTPATIENT
Start: 2025-04-03 | End: 2025-04-06 | Stop reason: HOSPADM

## 2025-04-03 RX ORDER — ACETAMINOPHEN 500 MG
1000 TABLET ORAL
Status: COMPLETED | OUTPATIENT
Start: 2025-04-03 | End: 2025-04-03

## 2025-04-03 RX ORDER — POLYETHYLENE GLYCOL 3350 17 G/17G
17 POWDER, FOR SOLUTION ORAL DAILY
Status: DISCONTINUED | OUTPATIENT
Start: 2025-04-04 | End: 2025-04-06 | Stop reason: HOSPADM

## 2025-04-03 RX ORDER — MORPHINE SULFATE 4 MG/ML
2 INJECTION, SOLUTION INTRAMUSCULAR; INTRAVENOUS
Status: DISCONTINUED | OUTPATIENT
Start: 2025-04-03 | End: 2025-04-06 | Stop reason: HOSPADM

## 2025-04-03 RX ORDER — MORPHINE SULFATE 4 MG/ML
4 INJECTION, SOLUTION INTRAMUSCULAR; INTRAVENOUS
Refills: 0 | Status: DISCONTINUED | OUTPATIENT
Start: 2025-04-03 | End: 2025-04-06 | Stop reason: HOSPADM

## 2025-04-03 RX ORDER — GLUCAGON 1 MG
1 KIT INJECTION
Status: DISCONTINUED | OUTPATIENT
Start: 2025-04-03 | End: 2025-04-06 | Stop reason: HOSPADM

## 2025-04-03 RX ORDER — ONDANSETRON HYDROCHLORIDE 2 MG/ML
4 INJECTION, SOLUTION INTRAVENOUS EVERY 6 HOURS PRN
Status: DISCONTINUED | OUTPATIENT
Start: 2025-04-03 | End: 2025-04-06 | Stop reason: HOSPADM

## 2025-04-03 RX ORDER — MORPHINE SULFATE 4 MG/ML
4 INJECTION, SOLUTION INTRAMUSCULAR; INTRAVENOUS
Refills: 0 | Status: COMPLETED | OUTPATIENT
Start: 2025-04-03 | End: 2025-04-03

## 2025-04-03 RX ORDER — ACETAMINOPHEN 325 MG/1
650 TABLET ORAL EVERY 4 HOURS PRN
Status: DISCONTINUED | OUTPATIENT
Start: 2025-04-03 | End: 2025-04-06 | Stop reason: HOSPADM

## 2025-04-03 RX ORDER — ALUMINUM HYDROXIDE, MAGNESIUM HYDROXIDE, AND SIMETHICONE 1200; 120; 1200 MG/30ML; MG/30ML; MG/30ML
30 SUSPENSION ORAL 4 TIMES DAILY PRN
Status: DISCONTINUED | OUTPATIENT
Start: 2025-04-03 | End: 2025-04-06 | Stop reason: HOSPADM

## 2025-04-03 RX ORDER — DEXTROMETHORPHAN POLISTIREX 30 MG/5 ML
1 SUSPENSION, EXTENDED RELEASE 12 HR ORAL DAILY PRN
Status: DISCONTINUED | OUTPATIENT
Start: 2025-04-03 | End: 2025-04-06 | Stop reason: HOSPADM

## 2025-04-03 RX ORDER — NALOXONE HCL 0.4 MG/ML
0.02 VIAL (ML) INJECTION
Status: DISCONTINUED | OUTPATIENT
Start: 2025-04-03 | End: 2025-04-06 | Stop reason: HOSPADM

## 2025-04-03 RX ORDER — BENZONATATE 100 MG/1
100 CAPSULE ORAL 3 TIMES DAILY PRN
Status: DISCONTINUED | OUTPATIENT
Start: 2025-04-03 | End: 2025-04-06 | Stop reason: HOSPADM

## 2025-04-03 RX ORDER — PROCHLORPERAZINE EDISYLATE 5 MG/ML
5 INJECTION INTRAMUSCULAR; INTRAVENOUS EVERY 6 HOURS PRN
Status: DISCONTINUED | OUTPATIENT
Start: 2025-04-03 | End: 2025-04-06 | Stop reason: HOSPADM

## 2025-04-03 RX ADMIN — PIPERACILLIN SODIUM AND TAZOBACTAM SODIUM 4.5 G: 4; .5 INJECTION, POWDER, FOR SOLUTION INTRAVENOUS at 08:04

## 2025-04-03 RX ADMIN — SODIUM CHLORIDE 1000 ML: 9 INJECTION, SOLUTION INTRAVENOUS at 07:04

## 2025-04-03 RX ADMIN — ACETAMINOPHEN 1000 MG: 500 TABLET ORAL at 06:04

## 2025-04-03 RX ADMIN — IOHEXOL 75 ML: 350 INJECTION, SOLUTION INTRAVENOUS at 07:04

## 2025-04-03 RX ADMIN — MORPHINE SULFATE 4 MG: 4 INJECTION INTRAVENOUS at 11:04

## 2025-04-03 RX ADMIN — MORPHINE SULFATE 4 MG: 4 INJECTION, SOLUTION INTRAMUSCULAR; INTRAVENOUS at 07:04

## 2025-04-03 NOTE — RESPIRATORY THERAPY
LACTATE  Lac 1.10    CHEM 8    Na 136  K 3.9  Cl 103  iCa 1.16  TCO2 21  Glu 110  BUN 13  Crea 1.0   Hct 52  AnGap 17

## 2025-04-03 NOTE — PLAN OF CARE-OVED
[facility] Plan of Care Note  Referral Source: Urgent Care                               Chief Complaint   Patient presents with    Abdominal Pain       Recommendation: Emergency Department            Emergency Department: [facility] [place]                 34-year-old male with history of anxiety was seen at urgent care for lower abdominal pain ongoing for the past 5 days, more constant and severe in the past few days, with associated chills and poor appetite.  Patient was found to be febrile on evaluation there with significant right lower quadrant tenderness, urgent care provider requesting transfer to ER for appendicitis evaluation, which is very reasonable.  Patient will go to [facility]; his influenza was negative at urgent care.

## 2025-04-03 NOTE — ED PROVIDER NOTES
Encounter Date: 4/3/2025       History     Chief Complaint   Patient presents with    Abdominal Pain     Pt complaining of worsening RLQ abdominal pain unrelieved by ibuprofen. Seen at [facility] and referred to ED for CT of appendix. 9/10 pain      Patient is a 34-year-old male with no past medical history who presents to the emergency department with complaints of right lower quadrant pain.  Patient states that his symptoms began 5 days ago, but significantly worsened yesterday.  He was seen at urgent care prior to arrival and referred to the emergency department for concerns of appendicitis.  Patient denies any past surgeries on his abdomen.  He has been taking ibuprofen and Pepto-Bismol with minimal relief.  He denies any diarrhea, he states that his last bowel movement was this morning and was normal for him.  He denies nausea, vomiting, chest pain, shortness of breath, weakness, numbness or tingling.        Review of patient's allergies indicates:  No Known Allergies  History reviewed. No pertinent past medical history.  History reviewed. No pertinent surgical history.  Family History   Problem Relation Name Age of Onset    No Known Problems Mother      Anxiety disorder Father      Anxiety disorder Sister      No Known Problems Son       Social History[1]  Review of Systems   Constitutional:  Positive for fever. Negative for chills.   Respiratory:  Negative for chest tightness and shortness of breath.    Cardiovascular:  Negative for chest pain and leg swelling.   Gastrointestinal:  Positive for abdominal pain. Negative for nausea.   Neurological:  Negative for dizziness and weakness.       Physical Exam     Initial Vitals [04/03/25 1732]   BP Pulse Resp Temp SpO2   132/72 (!) 126 18 (!) 101.6 °F (38.7 °C) 97 %      MAP       --         Physical Exam    Nursing note and vitals reviewed.  Constitutional: He appears well-developed and well-nourished. He is not diaphoretic. He does not appear ill. No distress.    HENT:   Head: Normocephalic and atraumatic.   Right Ear: External ear normal.   Left Ear: External ear normal.   Nose: Nose normal. Mouth/Throat: Uvula is midline and oropharynx is clear and moist.   Eyes: Conjunctivae and EOM are normal. Pupils are equal, round, and reactive to light. Right eye exhibits no discharge. Left eye exhibits no discharge. No scleral icterus.   Neck: Trachea normal.   Normal range of motion.   Full passive range of motion without pain.     Cardiovascular:  Normal rate and normal pulses.     Exam reveals no distant heart sounds.       Pulmonary/Chest: Effort normal. No respiratory distress.   Abdominal: Abdomen is soft. Bowel sounds are normal. He exhibits no distension and no pulsatile midline mass. There is abdominal tenderness (Right lower quadrant).   No right CVA tenderness.  No left CVA tenderness. There is guarding. There is no rebound.   Musculoskeletal:         General: Normal range of motion.      Cervical back: Full passive range of motion without pain and normal range of motion.     Neurological: He is alert and oriented to person, place, and time. He has normal strength. No cranial nerve deficit or sensory deficit. Coordination and gait normal.   Skin: Skin is warm and dry. Capillary refill takes less than 2 seconds. No bruising, no ecchymosis and no rash noted. No erythema.   Psychiatric: He has a normal mood and affect. His speech is normal and behavior is normal. Thought content normal.         ED Course   Procedures  Labs Reviewed   COMPREHENSIVE METABOLIC PANEL - Abnormal       Result Value    Sodium 135 (*)     Potassium 3.9      Chloride 105      CO2 17 (*)     Glucose 103      BUN 12      Creatinine 0.9      Calcium 9.7      Protein Total 8.7 (*)     Albumin 3.5      Bilirubin Total 1.4 (*)           AST 35      ALT 41      Anion Gap 13      eGFR >60     URINALYSIS, REFLEX TO URINE CULTURE - Abnormal    Color, UA Ariela      Appearance, UA Clear      pH, UA 6.0       Spec Grav UA 1.030      Protein, UA 1+ (*)     Glucose, UA Negative      Ketones, UA 2+ (*)     Bilirubin, UA 1+ (*)     Blood, UA 1+ (*)     Nitrites, UA Negative      Urobilinogen, UA >=8.0 (*)     Leukocyte Esterase, UA Negative     CBC WITH DIFFERENTIAL - Abnormal    WBC 26.20 (*)     RBC 5.44      HGB 15.7      HCT 46.9      MCV 86      MCH 28.9      MCHC 33.5      RDW 13.0      Platelet Count 388      MPV 9.0 (*)     Nucleated RBC 0     MANUAL DIFFERENTIAL - Abnormal    Gran # (ANC) 20.4      Segmented Neutrophil % 77.0 (*)     Bands % 1.0      Lymphocyte % 13.0 (*)     Monocyte % 8.0      Basophil % 1.0      Platelet Estimate Appears Normal     ISTAT PROCEDURE - Abnormal    POC Glucose 110      POC BUN 13      POC Creatinine 1.0      POC Sodium 136      POC Potassium 3.9      POC Chloride 103      POC TCO2 (MEASURED) 21 (*)     POC Anion Gap 17 (*)     POC Ionized Calcium 1.16      POC Hematocrit 52      Sample VENOUS      Site Other      Allens Test N/A     CULTURE, BLOOD   CULTURE, BLOOD   CBC W/ AUTO DIFFERENTIAL    Narrative:     The following orders were created for panel order CBC auto differential.  Procedure                               Abnormality         Status                     ---------                               -----------         ------                     CBC with Differential[[id number]]       Abnormal            Final result               Manual Differential[[id number]]         Abnormal            Final result                 Please view results for these tests on the individual orders.   URINALYSIS MICROSCOPIC    RBC, UA 2      WBC, UA 2      Bacteria, UA None      Hyaline Casts, UA 0      Microscopic Comment       LACTIC ACID, PLASMA   ISTAT LACTATE    POC Lactate 1.10      Sample VENOUS      Site Other      Allens Test N/A     ISTAT CHEM8          Imaging Results               CT Abdomen Pelvis With IV Contrast NO Oral Contrast (Final result)  Result time 04/03/25 19:42:13       Final result by Natividad Beard MD (04/03/25 19:42:13)                   Impression:      1. Extensive inflammatory changes in the right lower quadrant involving the cecum and distal/terminal ileum.  Questionable abnormal dilated appendix seen in the region.  Findings may reflect acute appendicitis with reactive inflammatory changes involving the cecum and distal/terminal ileum.  Alternatively findings can be seen with potential infectious or inflammatory acute enterocolitis with reactive change involving the appendix.  No organized fluid collection, drainable abscess, or perforation seen.  Surgical consultation is recommended.  2. Mild hepatosplenomegaly.  This report was flagged in Epic as abnormal.      Electronically signed by: Natividad Beard MD  Date:    04/03/2025  Time:    19:42               Narrative:    EXAMINATION:  CT ABDOMEN PELVIS WITH IV CONTRAST    CLINICAL HISTORY:  Abdominal pain, acute, nonlocalized;    TECHNIQUE:  Low dose axial images, sagittal and coronal reformations were obtained from the lung bases to the pubic symphysis following the IV administration of 75 mL of Omnipaque 350 .  Oral contrast was not given.    COMPARISON:  None.    FINDINGS:  The visualized portion of the heart is unremarkable.  The lung bases are clear.    Liver is enlarged measuring 20.5 cm.  No significant focal hepatic abnormalities are identified.  There is no intra-or extrahepatic biliary ductal dilatation.  The gallbladder is unremarkable.  The stomach, pancreas, and adrenal glands are unremarkable.  Spleen is mildly enlarged measuring 13 cm.    Kidneys enhance normally with no evidence of hydronephrosis.  No abnormalities are seen along the ureteral courses.  Urinary bladder is nondistended.  Prostate is unremarkable.    Normal appendix is not visualized.  Extensive inflammatory changes are seen involving the right lower quadrant and involving the cecum and distal/terminal ileum.  Questionable abnormal dilated  appendix is seen measuring 1.3 cm in caliber with increased mucosal enhancement.  No evidence of perforation or free air.  There is trace free fluid in the region and extending into the lower pelvis.  No evidence of bowel obstruction.    Aorta tapers normally.    No acute osseous abnormality identified. Small fat containing inguinal hernias are seen, right larger than left.                                       Medications   acetaminophen tablet 1,000 mg (1,000 mg Oral Given 4/3/25 1824)   sodium chloride 0.9% bolus 1,000 mL 1,000 mL (1,000 mLs Intravenous New Bag 4/3/25 1941)   morphine injection 4 mg (4 mg Intravenous Given 4/3/25 1941)   iohexoL (OMNIPAQUE 350) injection 75 mL (75 mLs Intravenous Given 4/3/25 1913)   piperacillin-tazobactam (ZOSYN) 4.5 g in D5W 100 mL IVPB (MB+) (4.5 g Intravenous New Bag 4/3/25 2000)     Medical Decision Making  Patient is a 34-year-old male with no past medical history who presents to the emergency department with complaints of right lower quadrant pain.  Patient states that his symptoms began 5 days ago, but significantly worsened yesterday.  He was seen at urgent care prior to arrival and referred to the emergency department for concerns of appendicitis.  Patient denies any past surgeries on his abdomen.  He has been taking ibuprofen and Pepto-Bismol with minimal relief.  He denies any diarrhea, he states that his last bowel movement was this morning and was normal for him.  He denies nausea, vomiting, chest pain, shortness of breath, weakness, numbness or tingling.    Differentials include but are not limited to AAA, aortic dissection, mesenteric ischemia, perforated viscous, MI/ACS, SBO/volvulus, incarcerated/strangulated hernia, intussusception, ileus, appendicitis, cholecystitis, cholangitis, diverticulitis, esophagitis, hepatitis, nephrolithiasis, pancreatitis, gastroenteritis, colitis, IBD/IBS, biliary colic, GERD, PUD, constipation, UTI/pyelonephritis,   disorder.    CBC with leukocytosis of 26K, CMP unremarkable, UA noninfectious appearing.  CT abdomen and pelvis with extensive inflammatory changes in the right lower quadrant involving the cecum and distal/terminal ileum.  Questionable abnormal dilated appendix seen in the region.  Findings may reflect acute appendicitis with reactive inflammatory changes involving the cecum and distal/terminal ileum.  Alternatively findings can be seen with potential infectious or inflammatory acute enterocolitis with reactive change involving the appendix.  No organized fluid collection, drainable abscess, or perforation seen.  Discussed case with general surgery and GI who both agreed that patient needs to be admitted for IV antibiotics.  Patient given a dose of Zosyn in the emergency department. After review of the patient's physical exam, ED testing, and history/symptoms, the patient requires additional care in the hospital overnight. HM will accept the patient and any labs, imaging, or procedures were discussed. The diagnosis, treatment and plan were discussed with the patient. All questions or concerns have been addressed.    Amount and/or Complexity of Data Reviewed  Labs: ordered.  Radiology: ordered.    Risk  OTC drugs.  Prescription drug management.  Decision regarding hospitalization.                                      Clinical Impression:  Final diagnoses:  [Z13.6] Screening for cardiovascular condition          ED Disposition Condition    Admit Stable                    [1]   Social History  Tobacco Use    Smoking status: Former     Current packs/day: 0.00     Average packs/day: 0.3 packs/day for 12.7 years (3.8 ttl pk-yrs)     Types: Cigarettes     Start date:      Quit date: 10/2022     Years since quittin.5    Smokeless tobacco: Never   Substance Use Topics    Alcohol use: Not Currently     Alcohol/week: 3.0 standard drinks of alcohol     Types: 3 Cans of beer per week    Drug use: Never        Shashi  MIREYA Self  04/03/25 2054

## 2025-04-03 NOTE — PROGRESS NOTES
"Subjective:      Patient ID: Mao Romero Jr. is a 34 y.o. male.    Vitals:  height is 5' 11" (1.803 m) and weight is 98 kg (216 lb). His oral temperature is 101.6 °F (38.7 °C) (abnormal). His blood pressure is 129/71 and his pulse is 122 (abnormal). His respiration is 20 and oxygen saturation is 99%.     Chief Complaint: Abdominal Pain    Patient is a 34-year-old male with complaint of abdominal pain for 5-6 days.  Patient states that the abdominal pain began intermittently, but has become increasingly worse over the last couple of days.  He has also developed fever in the last 2 days, has low appetite, and has sweats and chills.  Patient states his wife had similar symptoms a week and a half ago, hers resolved in a couple of days so he was waiting for this to past, but it has not.  Patient is febrile and tachycardic on arrival to the clinic.  Denies any nausea or vomiting, states last bowel movement was yesterday but did take a laxative to make sure he was not constipated.    Abdominal Pain  This is a new problem. The current episode started in the past 7 days. The onset quality is sudden. The problem occurs constantly. The most recent episode lasted 6 days. The problem has been unchanged. The pain is located in the LLQ and RLQ. The pain is at a severity of 9/10. The pain is moderate. The quality of the pain is aching, cramping and sharp. Associated symptoms include a fever. Pertinent negatives include no constipation, diarrhea, headaches, nausea or vomiting. Nothing aggravates the pain. The pain is relieved by Nothing. He has tried acetaminophen for the symptoms.       Constitution: Positive for appetite change, chills, sweating and fever. Negative for generalized weakness.   HENT:  Negative for ear pain, sinus pain and sore throat.    Neck: Negative for neck pain.   Cardiovascular:  Negative for chest pain.   Respiratory:  Negative for cough and shortness of breath.    Gastrointestinal:  Positive for " abdominal pain. Negative for nausea, vomiting, constipation and diarrhea.   Neurological:  Negative for headaches.      Objective:     Physical Exam   Constitutional: He is oriented to person, place, and time. He appears well-developed. He appears ill.      Comments:Patient is sitting on exam table, appears very uncomfortable, hunching over due to abdominal pain.     HENT:   Head: Normocephalic and atraumatic.   Ears:   Right Ear: External ear normal.   Left Ear: External ear normal.   Nose: Nose normal.   Mouth/Throat: Mucous membranes are normal.   Eyes: Conjunctivae and lids are normal.   Neck: Trachea normal. Neck supple.   Cardiovascular: Regular rhythm and normal heart sounds. Tachycardia present.   Pulmonary/Chest: Effort normal and breath sounds normal. No respiratory distress.   Abdominal: Normal appearance and bowel sounds are normal. He exhibits no distension and no mass. Soft. flat abdomen There is abdominal tenderness in the right lower quadrant. There is rebound and guarding. There is no left CVA tenderness and no right CVA tenderness.   Musculoskeletal: Normal range of motion.         General: Normal range of motion.   Neurological: He is alert and oriented to person, place, and time. He has normal strength.   Skin: Skin is warm, dry, intact, not diaphoretic and not pale.   Psychiatric: His speech is normal and behavior is normal. Judgment and thought content normal.   Nursing note and vitals reviewed.      Assessment:     1. RLQ abdominal pain    2. Fever, unspecified fever cause        Plan:   Patient is febrile and tachycardic on arrival to clinic, with considerable right lower quadrant pain.  On physical exam he has significant tenderness to the right lower quadrant with guarding.  Flu test negative, urine negative for infection.  Based on history and physical exam, I believe he would benefit from an appendicitis rule out.  I discussed his case with the [facility] providers, who agreed an emergency room  evaluation would be beneficial.  Offered transportation for patient, but he declined we will go by private vehicle.  He we will go to the [facility] for evaluation, patient instructed to remain NPO until he has been evaluated by an emergency room physician.    Results for orders placed or performed in visit on 04/03/25   POCT Influenza A/B MOLECULAR    Collection Time: 04/03/25  4:59 PM   Result Value Ref Range    POC Molecular Influenza A Ag Negative Negative    POC Molecular Influenza B Ag Negative Negative     Acceptable Yes    POCT Urinalysis(Instrument)    Collection Time: 04/03/25  5:21 PM   Result Value Ref Range    Color, POC UA Yellow Yellow, Straw, Colorless    Clarity, POC UA Clear Clear    Glucose, POC UA Negative Negative    Bilirubin, POC UA Small (A) Negative    Ketones, POC UA 40 (A) Negative    Spec Grav POC UA 1.020 1.005 - 1.030    Blood, POC UA Trace-intact (A) Negative    pH, POC UA 7.0 5.0 - 8.0    Protein, POC  (A) Negative    Urobilinogen, POC UA >=8.0 (A) <=1.0    Nitrite, POC UA Negative Negative    WBC, POC UA Negative Negative           RLQ abdominal pain  -     Refer to Emergency Dept.    Fever, unspecified fever cause  -     POCT Influenza A/B MOLECULAR  -     POCT Urinalysis(Instrument)  -     Refer to Emergency Dept.

## 2025-04-03 NOTE — LETTER
April 6, 2025         Gabrielle SCHNEIDER  [facility]  [place] [address]  Phone: [phone]  Fax: [phone]       Patient: Mao Romero   YOB: 1990  Date of Visit: 04/03/2025- 04/06/2025    To Whom It May Concern:    Jelena Romero  was at [facility] on 04/03/2025-04/06/2025. The patient may return to work/school on 04/08/2025 with no restrictions. If you have any questions or concerns, or if I can be of further assistance, please do not hesitate to contact me.    Sincerely,    POLLY Strickland MD

## 2025-04-04 LAB
ABSOLUTE EOSINOPHIL (OHS): 0.14 K/UL
ABSOLUTE MONOCYTE (OHS): 3.09 K/UL (ref 0.3–1)
ABSOLUTE NEUTROPHIL COUNT (OHS): 19.34 K/UL (ref 1.8–7.7)
ALBUMIN SERPL BCP-MCNC: 2.8 G/DL (ref 3.5–5.2)
ALP SERPL-CCNC: 120 UNIT/L (ref 40–150)
ALT SERPL W/O P-5'-P-CCNC: 28 UNIT/L (ref 10–44)
ANION GAP (OHS): 9 MMOL/L (ref 8–16)
AST SERPL-CCNC: 17 UNIT/L (ref 11–45)
BASOPHILS # BLD AUTO: 0.07 K/UL
BASOPHILS NFR BLD AUTO: 0.3 %
BILIRUB SERPL-MCNC: 1.1 MG/DL (ref 0.1–1)
BUN SERPL-MCNC: 11 MG/DL (ref 6–20)
C DIFF GDH STL QL: NEGATIVE
C DIFF TOX A+B STL QL IA: NEGATIVE
CALCIUM SERPL-MCNC: 8.6 MG/DL (ref 8.7–10.5)
CHLORIDE SERPL-SCNC: 105 MMOL/L (ref 95–110)
CO2 SERPL-SCNC: 21 MMOL/L (ref 23–29)
CREAT SERPL-MCNC: 0.9 MG/DL (ref 0.5–1.4)
ERYTHROCYTE [DISTWIDTH] IN BLOOD BY AUTOMATED COUNT: 13.2 % (ref 11.5–14.5)
GFR SERPLBLD CREATININE-BSD FMLA CKD-EPI: >60 ML/MIN/1.73/M2
GLUCOSE SERPL-MCNC: 90 MG/DL (ref 70–110)
HCT VFR BLD AUTO: 39.2 % (ref 40–54)
HGB BLD-MCNC: 12.8 GM/DL (ref 14–18)
IMM GRANULOCYTES # BLD AUTO: 0.21 K/UL (ref 0–0.04)
IMM GRANULOCYTES NFR BLD AUTO: 0.8 % (ref 0–0.5)
IRON SATN MFR SERPL: 8 % (ref 20–50)
IRON SERPL-MCNC: 13 UG/DL (ref 45–160)
LYMPHOCYTES # BLD AUTO: 2.53 K/UL (ref 1–4.8)
MAGNESIUM SERPL-MCNC: 2 MG/DL (ref 1.6–2.6)
MCH RBC QN AUTO: 28.5 PG (ref 27–31)
MCHC RBC AUTO-ENTMCNC: 32.7 G/DL (ref 32–36)
MCV RBC AUTO: 87 FL (ref 82–98)
NUCLEATED RBC (/100WBC) (OHS): 0 /100 WBC
OB PNL STL: NEGATIVE
OHS QRS DURATION: 76 MS
OHS QTC CALCULATION: 420 MS
PHOSPHATE SERPL-MCNC: 3 MG/DL (ref 2.7–4.5)
PLATELET # BLD AUTO: 350 K/UL (ref 150–450)
PMV BLD AUTO: 9 FL (ref 9.2–12.9)
POTASSIUM SERPL-SCNC: 3.7 MMOL/L (ref 3.5–5.1)
PROT SERPL-MCNC: 7 GM/DL (ref 6–8.4)
RBC # BLD AUTO: 4.49 M/UL (ref 4.6–6.2)
RELATIVE EOSINOPHIL (OHS): 0.6 %
RELATIVE LYMPHOCYTE (OHS): 10 % (ref 18–48)
RELATIVE MONOCYTE (OHS): 12.2 % (ref 4–15)
RELATIVE NEUTROPHIL (OHS): 76.1 % (ref 38–73)
SODIUM SERPL-SCNC: 135 MMOL/L (ref 136–145)
TIBC SERPL-MCNC: 169 UG/DL (ref 250–450)
TRANSFERRIN SERPL-MCNC: 114 MG/DL (ref 200–375)
WBC # BLD AUTO: 25.38 K/UL (ref 3.9–12.7)
WBC #/AREA STL HPF: ABNORMAL /[HPF]

## 2025-04-04 PROCEDURE — 82272 OCCULT BLD FECES 1-3 TESTS: CPT | Performed by: STUDENT IN AN ORGANIZED HEALTH CARE EDUCATION/TRAINING PROGRAM

## 2025-04-04 PROCEDURE — 87427 SHIGA-LIKE TOXIN AG IA: CPT | Performed by: NURSE PRACTITIONER

## 2025-04-04 PROCEDURE — 84100 ASSAY OF PHOSPHORUS: CPT

## 2025-04-04 PROCEDURE — 84466 ASSAY OF TRANSFERRIN: CPT | Performed by: NURSE PRACTITIONER

## 2025-04-04 PROCEDURE — 11000001 HC ACUTE MED/SURG PRIVATE ROOM

## 2025-04-04 PROCEDURE — 83735 ASSAY OF MAGNESIUM: CPT

## 2025-04-04 PROCEDURE — 87507 IADNA-DNA/RNA PROBE TQ 12-25: CPT | Performed by: NURSE PRACTITIONER

## 2025-04-04 PROCEDURE — 25000003 PHARM REV CODE 250

## 2025-04-04 PROCEDURE — 89055 LEUKOCYTE ASSESSMENT FECAL: CPT | Performed by: NURSE PRACTITIONER

## 2025-04-04 PROCEDURE — 87045 FECES CULTURE AEROBIC BACT: CPT | Performed by: NURSE PRACTITIONER

## 2025-04-04 PROCEDURE — 63600175 PHARM REV CODE 636 W HCPCS

## 2025-04-04 PROCEDURE — 82247 BILIRUBIN TOTAL: CPT

## 2025-04-04 PROCEDURE — 99223 1ST HOSP IP/OBS HIGH 75: CPT | Mod: ,,, | Performed by: NURSE PRACTITIONER

## 2025-04-04 PROCEDURE — 87449 NOS EACH ORGANISM AG IA: CPT | Performed by: NURSE PRACTITIONER

## 2025-04-04 PROCEDURE — 85025 COMPLETE CBC W/AUTO DIFF WBC: CPT

## 2025-04-04 PROCEDURE — 36415 COLL VENOUS BLD VENIPUNCTURE: CPT

## 2025-04-04 PROCEDURE — 27000207 HC ISOLATION

## 2025-04-04 RX ADMIN — POLYETHYLENE GLYCOL 3350 17 G: 17 POWDER, FOR SOLUTION ORAL at 08:04

## 2025-04-04 RX ADMIN — ACETAMINOPHEN 650 MG: 325 TABLET ORAL at 07:04

## 2025-04-04 RX ADMIN — PIPERACILLIN SODIUM AND TAZOBACTAM SODIUM 4.5 G: 4; .5 INJECTION, POWDER, FOR SOLUTION INTRAVENOUS at 04:04

## 2025-04-04 RX ADMIN — PIPERACILLIN SODIUM AND TAZOBACTAM SODIUM 4.5 G: 4; .5 INJECTION, POWDER, FOR SOLUTION INTRAVENOUS at 11:04

## 2025-04-04 RX ADMIN — PIPERACILLIN SODIUM AND TAZOBACTAM SODIUM 4.5 G: 4; .5 INJECTION, POWDER, FOR SOLUTION INTRAVENOUS at 08:04

## 2025-04-04 NOTE — ASSESSMENT & PLAN NOTE
CT A/P: Extensive inflammatory changes in the right lower quadrant involving the cecum and distal/terminal ileum.  Questionable abnormal dilated appendix seen in the region.    This patient does have evidence of infective focus: Abdominal.  Organ dysfunction not indicated.  Latest lactate reviewed:   Recent Labs   Lab 04/03/25  1821 04/03/25  2325   LACTATE  --  1.4   POCLAC 1.10  --      Vitals:    04/03/25 2356 04/03/25 2357 04/04/25 0003   BP:  (!) 106/57    Pulse:  106 107   Resp: 18 18    Temp:  98.6 °F (37 °C)    SpO2:  100%      Fluid challenge Fluid Not Needed - Patient is not hypotensive and/or lactate is less than 4.0..  Post- resuscitation assessment: No - Post resuscitation assessment not needed   My overall impression is sepsis.    Monitor BP closely.  Will give further fluids as safely tolerated while taking care not to overhydrate.  If pt unable to maintain MAP goal of 65+, then will start vasopressors (eg peripheral Levophed) and contact the ICU  Morphine for pain.  Avoiding NSAIDs until Crohn's ruled out since they are known to worsen flares.  Source control achieved by:  GSGY consulted; anticipate IV ABX to help calm the area down prior to any operation  GI consulted; also needs to have the area calm down prior to any C-scope to eval for Crohn's    Antibiotics (From admission, onward)      Start     Stop Route Frequency Ordered    04/04/25 0400  piperacillin-tazobactam (ZOSYN) 4.5 g in D5W 100 mL IVPB (MB+)         -- IV Every 8 hours (non-standard times) 04/03/25 0682

## 2025-04-04 NOTE — NURSING
[facility]  Nurses Note -- 4 Eyes      4/4/2025       Skin assessed on: Q Shift      [x] No Pressure Injuries Present    [x]Prevention Measures Documented    [] Yes LDA  for Pressure Injury Previously documented     [] Yes New Pressure Injury Discovered   [] LDA for New Pressure Injury Added      Attending RN:  Doni Rodgers RN     Second RN:  Olesya Bunch RN

## 2025-04-04 NOTE — CONSULTS
"General Surgery Consult Note    SUBJECTIVE:     History of Present Illness:  Patient is a 34 y.o. male presents with right lower quadrant abdominal pain that has been worsening over the last 5 days. He states the pain comes and goes; at its worst it is 10/10 in severity. He states as of this morning it was 1/10 in severity. He reports normal flatus and bowel movements. He was febrile overnight to 101.6 and tachycardic on admission.     He currently endorses feeling hungry.  He has not had any abdominal surgery or a colonoscopy. He does not have any family members with IBD or chronic GI issues that he knows of.     Chief Complaint   Patient presents with    Abdominal Pain     Pt complaining of worsening RLQ abdominal pain unrelieved by ibuprofen. Seen at [facility] and referred to ED for CT of appendix. 9/10 pain        Review of patient's allergies indicates:  No Known Allergies    Current Medications[1]    History reviewed. No pertinent past medical history.  History reviewed. No pertinent surgical history.  Family History   Problem Relation Name Age of Onset    No Known Problems Mother      Anxiety disorder Father      Anxiety disorder Sister      No Known Problems Son       Social History[2]     Review of Systems:  Review of Systems      OBJECTIVE:     Vital Signs (Most Recent)  Temp: 97.4 °F (36.3 °C) (04/04/25 1551)  Pulse: 88 (04/04/25 1551)  Resp: 18 (04/04/25 1551)  BP: 131/77 (04/04/25 1551)  SpO2: 98 % (04/04/25 1551)  5' 11" (1.803 m)  98 kg (216 lb)     Physical Exam:  Physical Exam    No acute distress, alert and oriented  Abdomen soft, not distended, focal tenderness in RLQ  Laboratory  Component      Latest Ref Rng 4/3/2025 4/4/2025   WBC      3.90 - 12.70 K/uL 26.20 (H)  25.38 (H)    RBC      4.60 - 6.20 M/uL 5.44  4.49 (L)    Hemoglobin      14.0 - 18.0 gm/dL 15.7  12.8 (L)    Hematocrit      40.0 - 54.0 % 46.9  39.2 (L)    MCV      82 - 98 fL 86  87    MCH      27.0 - 31.0 pg 28.9  28.5    MCHC      " 32.0 - 36.0 g/dL 33.5  32.7    RDW      11.5 - 14.5 % 13.0  13.2    Platelet Count      150 - 450 K/uL 388  350    MPV      9.2 - 12.9 fL 9.0 (L)  9.0 (L)    nRBC      <=0 /100 WBC 0  0    Gran # (ANC)      1.8 - 7.7 K/uL 20.4  19.34 (H)    Lymph %      18 - 48 % 13.0 (L)  10.0 (L)    Mono %      4 - 15 % 8.0  12.2    Basophil %      <=1.9 % 1.0  0.3    Neut %      38 - 73 %  76.1 (H)    Eos %      <=8 %  0.6    Immature Granulocytes      0.0 - 0.5 %  0.8 (H)    Lymph #      1 - 4.8 K/uL  2.53    Mono #      0.3 - 1 K/uL  3.09 (H)    Eos #      <=0.5 K/uL  0.14    Baso #      <=0.2 K/uL  0.07    Immature Grans (Abs)      0.00 - 0.04 K/uL  0.21 (H)       Legend:  (H) High  (L) Low    Diagnostic Results:  Narrative & Impression  EXAMINATION:  CT ABDOMEN PELVIS WITH IV CONTRAST     CLINICAL HISTORY:  Abdominal pain, acute, nonlocalized;     TECHNIQUE:  Low dose axial images, sagittal and coronal reformations were obtained from the lung bases to the pubic symphysis following the IV administration of 75 mL of Omnipaque 350 .  Oral contrast was not given.     COMPARISON:  None.     FINDINGS:  The visualized portion of the heart is unremarkable.  The lung bases are clear.     Liver is enlarged measuring 20.5 cm.  No significant focal hepatic abnormalities are identified.  There is no intra-or extrahepatic biliary ductal dilatation.  The gallbladder is unremarkable.  The stomach, pancreas, and adrenal glands are unremarkable.  Spleen is mildly enlarged measuring 13 cm.     Kidneys enhance normally with no evidence of hydronephrosis.  No abnormalities are seen along the ureteral courses.  Urinary bladder is nondistended.  Prostate is unremarkable.     Normal appendix is not visualized.  Extensive inflammatory changes are seen involving the right lower quadrant and involving the cecum and distal/terminal ileum.  Questionable abnormal dilated appendix is seen measuring 1.3 cm in caliber with increased mucosal enhancement.  No  evidence of perforation or free air.  There is trace free fluid in the region and extending into the lower pelvis.  No evidence of bowel obstruction.     Aorta tapers normally.     No acute osseous abnormality identified. Small fat containing inguinal hernias are seen, right larger than left.     Impression:     1. Extensive inflammatory changes in the right lower quadrant involving the cecum and distal/terminal ileum.  Questionable abnormal dilated appendix seen in the region.  Findings may reflect acute appendicitis with reactive inflammatory changes involving the cecum and distal/terminal ileum.  Alternatively findings can be seen with potential infectious or inflammatory acute enterocolitis with reactive change involving the appendix.  No organized fluid collection, drainable abscess, or perforation seen.  Surgical consultation is recommended.  2. Mild hepatosplenomegaly.  This report was flagged in Epic as abnormal.        Electronically signed by:Natividad Beard MD  Date:                                            04/03/2025  Time:                                           19:42  ASSESSMENT/PLAN:     34 year old man presents with RLQ abdominal pain and fevers.  CT scan demonstrated terminal ileitis as well as inflammation of cecum and appendix    PLAN:Plan     -clinical presentation and imaging more suggestive of terminal ileitis/colitis of infectious or inflammatory origin rather than appendicitis  -clear liquid diet  -appreciate GI recommendations  -serial abdominal exams. Tenderness and leukocytosis should significantly improve before advancing diet furterh  -will continue to follow    Luiz Arevalo MD   General Surgery  [facility]               [1]   Current Facility-Administered Medications   Medication Dose Route Frequency Provider Last Rate Last Admin    acetaminophen tablet 650 mg  650 mg Oral Q4H PRN Renaldo Fuller MD   650 mg at 04/04/25 0712    aluminum-magnesium hydroxide-simethicone  200-200-20 mg/5 mL suspension 30 mL  30 mL Oral QID PRN Renaldo Fuller MD        benzonatate capsule 100 mg  100 mg Oral TID PRN Renaldo Fuller MD        glucagon (human recombinant) injection 1 mg  1 mg Intramuscular PRN Renaldo Fuller MD        glucose chewable tablet 16 g  16 g Oral PRN Renaldo Fuller MD        glucose chewable tablet 24 g  24 g Oral PRN Renaldo Fuller MD        guaiFENesin 100 mg/5 ml syrup 200 mg  200 mg Oral Q4H PRN Renaldo Fuller MD        hydrALAZINE injection 10 mg  10 mg Intravenous Q6H PRN Renaldo Fuller MD        loperamide capsule 4 mg  4 mg Oral Once PRN Renaldo Fuller MD        melatonin tablet 6 mg  6 mg Oral Nightly PRN Renaldo Fuller MD        mineral oil enema 1 enema  1 enema Rectal Daily PRN Renaldo Fuller MD        morphine injection 2 mg  2 mg Intravenous Q3H PRN Renaldo Fuller MD        morphine injection 4 mg  4 mg Intravenous Q3H PRN Renaldo Fuller MD   4 mg at 25 2356    naloxone 0.4 mg/mL injection 0.02 mg  0.02 mg Intravenous PRRenaldo Avalos MD        ondansetron injection 4 mg  4 mg Intravenous Q6H PRN Renaldo Fuller MD        piperacillin-tazobactam (ZOSYN) 4.5 g in D5W 100 mL IVPB (MB+)  4.5 g Intravenous Q8H Renaldo Fuller MD   Stopped at 25 1539    polyethylene glycol packet 17 g  17 g Oral Daily Renaldo Fuller MD   17 g at 25 0820    prochlorperazine injection Soln 5 mg  5 mg Intravenous Q6H PRRenaldo Avalos MD        sodium chloride 0.9% flush 10 mL  10 mL Intravenous Q12H PRN Renaldo Fuller MD       [2]   Social History  Tobacco Use    Smoking status: Former     Current packs/day: 0.00     Average packs/day: 0.3 packs/day for 12.7 years (3.8 ttl pk-yrs)     Types: Cigarettes     Start date:      Quit date: 10/2022     Years since quittin.5    Smokeless tobacco: Never   Substance Use Topics    Alcohol use: Not Currently     Alcohol/week: 3.0 standard drinks of alcohol     Types: 3  Cans of beer per week    Drug use: Never

## 2025-04-04 NOTE — SUBJECTIVE & OBJECTIVE
History reviewed. No pertinent past medical history.    History reviewed. No pertinent surgical history.    Review of patient's allergies indicates:  No Known Allergies    No current facility-administered medications on file prior to encounter.     Current Outpatient Medications on File Prior to Encounter   Medication Sig    sertraline (ZOLOFT) 50 MG tablet TAKE 1 TABLET BY MOUTH EVERY DAY    [DISCONTINUED] cetirizine (ZYRTEC) 10 MG tablet Take 1 tablet (10 mg total) by mouth once daily. for 7 days    [DISCONTINUED] famotidine (PEPCID) 20 MG tablet Take 1 tablet (20 mg total) by mouth 2 (two) times daily. for 7 days     Family History       Problem Relation (Age of Onset)    Anxiety disorder Father, Sister    No Known Problems Mother, Son          Tobacco Use    Smoking status: Former     Current packs/day: 0.00     Average packs/day: 0.3 packs/day for 12.7 years (3.8 ttl pk-yrs)     Types: Cigarettes     Start date:      Quit date: 10/2022     Years since quittin.5    Smokeless tobacco: Never   Substance and Sexual Activity    Alcohol use: Not Currently     Alcohol/week: 3.0 standard drinks of alcohol     Types: 3 Cans of beer per week    Drug use: Never    Sexual activity: Yes     Partners: Female     Birth control/protection: None     Review of Systems   Reason unable to perform ROS: ROS was performed and pertinent +s and -s are listed in HPI.     Objective:     Vital Signs (Most Recent):  Temp: 98.9 °F (37.2 °C) (25)  Pulse: 108 (25)  Resp: 19 (25)  BP: 126/73 (25)  SpO2: 97 % (25) Vital Signs (24h Range):  Temp:  [98.9 °F (37.2 °C)-101.6 °F (38.7 °C)] 98.9 °F (37.2 °C)  Pulse:  [108-126] 108  Resp:  [18-20] 19  SpO2:  [97 %-99 %] 97 %  BP: (126-132)/(71-73) 126/73     Weight: 99.8 kg (220 lb)  Body mass index is 30.68 kg/m².     Physical Exam  Constitutional:       General: He is not in acute distress.     Appearance: Normal appearance. He is not  ill-appearing.   HENT:      Head: Normocephalic.   Neck:      Comments: JVP not elevated  Cardiovascular:      Rate and Rhythm: Normal rate and regular rhythm.      Pulses: Normal pulses.      Heart sounds: Normal heart sounds.   Pulmonary:      Effort: Pulmonary effort is normal.      Breath sounds: Normal breath sounds.   Abdominal:      General: Abdomen is flat. Bowel sounds are normal.      Tenderness: There is abdominal tenderness. There is guarding and rebound.   Musculoskeletal:      Right lower leg: No edema.      Left lower leg: No edema.   Skin:     General: Skin is warm and dry.      Capillary Refill: Capillary refill takes less than 2 seconds.      Coloration: Skin is not jaundiced.   Neurological:      General: No focal deficit present.      Mental Status: He is alert and oriented to person, place, and time.   Psychiatric:         Mood and Affect: Mood normal.         Behavior: Behavior normal.                Significant Labs: All pertinent labs within the past 24 hours have been reviewed.  CBC:   Recent Labs   Lab 04/03/25  1816 04/03/25  1821   WBC 26.20*  --    HGB 15.7  --    HCT 46.9 52     --      CMP:   Recent Labs   Lab 04/03/25  1816   *   K 3.9      CO2 17*   BUN 12   CREATININE 0.9   CALCIUM 9.7   ALBUMIN 3.5   BILITOT 1.4*   ALKPHOS 128   AST 35   ALT 41   ANIONGAP 13     Urine Studies:   Recent Labs   Lab 04/03/25  1721 04/03/25  1828   COLORU Yellow Ariela   APPEARANCEUA  --  Clear   PHUR 7.0 6.0   SPECGRAV 1.020 1.030   PROTEINUA  --  1+*   GLUCUA  --  Negative   KETONESU 40*  --    BILIRUBINUA  --  1+*   OCCULTUA  --  1+*   NITRITE Negative Negative   UROBILINOGEN >=8.0* >=8.0*   LEUKOCYTESUR  --  Negative   RBCUA  --  2   WBCUA  --  2   BACTERIA  --  None   HYALINECASTS  --  0       Significant Imaging: I have reviewed all pertinent imaging results/findings within the past 24 hours.    Results for orders placed or performed during the hospital encounter of 04/03/25  (from the past 2160 hours)   CT Abdomen Pelvis With IV Contrast NO Oral Contrast    Narrative    EXAMINATION:  CT ABDOMEN PELVIS WITH IV CONTRAST    CLINICAL HISTORY:  Abdominal pain, acute, nonlocalized;    TECHNIQUE:  Low dose axial images, sagittal and coronal reformations were obtained from the lung bases to the pubic symphysis following the IV administration of 75 mL of Omnipaque 350 .  Oral contrast was not given.    COMPARISON:  None.    FINDINGS:  The visualized portion of the heart is unremarkable.  The lung bases are clear.    Liver is enlarged measuring 20.5 cm.  No significant focal hepatic abnormalities are identified.  There is no intra-or extrahepatic biliary ductal dilatation.  The gallbladder is unremarkable.  The stomach, pancreas, and adrenal glands are unremarkable.  Spleen is mildly enlarged measuring 13 cm.    Kidneys enhance normally with no evidence of hydronephrosis.  No abnormalities are seen along the ureteral courses.  Urinary bladder is nondistended.  Prostate is unremarkable.    Normal appendix is not visualized.  Extensive inflammatory changes are seen involving the right lower quadrant and involving the cecum and distal/terminal ileum.  Questionable abnormal dilated appendix is seen measuring 1.3 cm in caliber with increased mucosal enhancement.  No evidence of perforation or free air.  There is trace free fluid in the region and extending into the lower pelvis.  No evidence of bowel obstruction.    Aorta tapers normally.    No acute osseous abnormality identified. Small fat containing inguinal hernias are seen, right larger than left.      Impression    1. Extensive inflammatory changes in the right lower quadrant involving the cecum and distal/terminal ileum.  Questionable abnormal dilated appendix seen in the region.  Findings may reflect acute appendicitis with reactive inflammatory changes involving the cecum and distal/terminal ileum.  Alternatively findings can be seen with  potential infectious or inflammatory acute enterocolitis with reactive change involving the appendix.  No organized fluid collection, drainable abscess, or perforation seen.  Surgical consultation is recommended.  2. Mild hepatosplenomegaly.  This report was flagged in Epic as abnormal.      Electronically signed by: Natividad Beard MD  Date:    04/03/2025  Time:    19:42

## 2025-04-04 NOTE — NURSING
Patient arrived to floor via wheelchair transported from ED. Patient transferred to bed independently. AAOx4.  Patient was oriented to room, information on whiteboard, and medication regimen.  Bed low, adequate lighting provided, side rails x2 up, call bell within reach.  Admission assessment completed.  IVF saline locked. VSS. Patient denied having any acute distress at this time. None observed. Will continue to monitor and follow treatment plan.      [facility]  Nurses Note -- 4 Eyes      4/4/2025       Skin assessed on: Admit      [x] No Pressure Injuries Present    [x]Prevention Measures Documented    [] Yes LDA  for Pressure Injury Previously documented     [] Yes New Pressure Injury Discovered   [] LDA for New Pressure Injury Added      Attending RN:  Asha Gonzalez LPN     Second RN:  Galen Jorge RN

## 2025-04-04 NOTE — ASSESSMENT & PLAN NOTE
CT A/P: Extensive inflammatory changes in the right lower quadrant involving the cecum and distal/terminal ileum.  Questionable abnormal dilated appendix seen in the region.    This patient does have evidence of infective focus: Abdominal.  Organ dysfunction not indicated.  Latest lactate reviewed:   Recent Labs   Lab 04/03/25 1821 04/03/25 2325   LACTATE  --  1.4   POCLAC 1.10  --      Vitals:     Vitals:    04/04/25 0712 04/04/25 0800 04/04/25 1049   BP: 107/66  130/80   Pulse: 105 105 83   Resp: 18  18   Temp: 98.4 °F (36.9 °C)  97.5 °F (36.4 °C)   SpO2: 97%  98%   Fluid challenge Fluid Not Needed - Patient is not hypotensive and/or lactate is less than 4.0..  Post- resuscitation assessment: No - Post resuscitation assessment not needed   My overall impression is sepsis.      Antibiotics (From admission, onward)     Antibiotics (From admission, onward)      Start     Stop Route Frequency Ordered    04/04/25 0400  piperacillin-tazobactam (ZOSYN) 4.5 g in D5W 100 mL IVPB (MB+)         -- IV Every 8 hours (non-standard times) 04/03/25 2142          - General Surgery consulted as well as GI -- on zosyn, stool studies sent  - continue with CLD, pain is improving  CT A/P: Extensive inflammatory changes in the right lower quadrant involving the cecum and distal/terminal ileum.  Questionable abnormal dilated appendix seen in the region.    This patient does have evidence of infective focus: Abdominal.  Organ dysfunction not indicated.  Latest lactate reviewed:   Recent Labs     Recent Labs   Lab 04/03/25 1821 04/03/25 2325   LACTATE  --  1.4   POCLAC 1.10  --      Vitals:    04/04/25 0712 04/04/25 0800 04/04/25 1049   BP: 107/66  130/80   Pulse: 105 105 83   Resp: 18  18   Temp: 98.4 °F (36.9 °C)  97.5 °F (36.4 °C)   SpO2: 97%  98%   My overall impression is sepsis.    Monitor BP closely.  Will give further fluids as safely tolerated while taking care not to overhydrate.  If pt unable to maintain MAP goal of 65+,  then will start vasopressors (eg peripheral Levophed) and contact the ICU  Morphine for pain.  Avoiding NSAIDs until Crohn's ruled out since they are known to worsen flares.  Source control achieved by:  GSGY consulted; anticipate IV ABX to help calm the area down prior to any operation  GI consulted; also needs to have the area calm down prior to any C-scope to eval for Crohn's    Antibiotics (From admission, onward)       Antibiotics (From admission, onward)      Start     Stop Route Frequency Ordered    04/04/25 0400  piperacillin-tazobactam (ZOSYN) 4.5 g in D5W 100 mL IVPB (MB+)         -- IV Every 8 hours (non-standard times) 04/03/25 7548

## 2025-04-04 NOTE — PLAN OF CARE
Patient AAOx4 and able to make needs known. Patient remains with NPO due to consults. IV abx administered,per order. No acute distress noted, patient free from falls or injury this shift.  Bed in low position, wheels locked, call light in reach for assistance, plan of care continued.      Problem: Infection  Goal: Absence of Infection Signs and Symptoms  Outcome: Progressing

## 2025-04-04 NOTE — SUBJECTIVE & OBJECTIVE
Interval History: no acute issues, reports his pain is feeling better. Tolerating clear liquids. Had dark, liquid stool earlier sent for testing.     Review of Systems  Objective:     Vital Signs (Most Recent):  Temp: 97.5 °F (36.4 °C) (04/04/25 1049)  Pulse: 83 (04/04/25 1049)  Resp: 18 (04/04/25 1049)  BP: 130/80 (04/04/25 1049)  SpO2: 98 % (04/04/25 1049) Vital Signs (24h Range):  Temp:  [97.5 °F (36.4 °C)-101.6 °F (38.7 °C)] 97.5 °F (36.4 °C)  Pulse:  [] 83  Resp:  [18-20] 18  SpO2:  [97 %-100 %] 98 %  BP: (106-132)/(57-80) 130/80     Weight: 98 kg (216 lb)  Body mass index is 30.13 kg/m².    Intake/Output Summary (Last 24 hours) at 4/4/2025 1540  Last data filed at 4/4/2025 1335  Gross per 24 hour   Intake 116.19 ml   Output --   Net 116.19 ml         Physical Exam  Constitutional:       General: He is not in acute distress.     Appearance: Normal appearance. He is not ill-appearing.   HENT:      Head: Normocephalic.   Cardiovascular:      Rate and Rhythm: Normal rate and regular rhythm.      Pulses: Normal pulses.      Heart sounds: Normal heart sounds.   Pulmonary:      Effort: Pulmonary effort is normal.      Breath sounds: Normal breath sounds.   Abdominal:      General: Abdomen is flat. Bowel sounds are normal.      Tenderness: There is abdominal tenderness. There is guarding and rebound.   Musculoskeletal:      Right lower leg: No edema.      Left lower leg: No edema.   Skin:     General: Skin is warm and dry.      Capillary Refill: Capillary refill takes less than 2 seconds.      Coloration: Skin is not jaundiced.   Neurological:      General: No focal deficit present.      Mental Status: He is alert and oriented to person, place, and time.   Psychiatric:         Mood and Affect: Mood normal.         Behavior: Behavior normal.               Significant Labs: All pertinent labs within the past 24 hours have been reviewed.    Significant Imaging: I have reviewed all pertinent imaging results/findings  within the past 24 hours.

## 2025-04-04 NOTE — ASSESSMENT & PLAN NOTE
CT A/P: Extensive inflammatory changes in the right lower quadrant involving the cecum and distal/terminal ileum.  Questionable abnormal dilated appendix seen in the region.    This patient does have evidence of infective focus: Abdominal.  Organ dysfunction not indicated.  Latest lactate reviewed:   Recent Labs   Lab 04/03/25 1821 04/03/25 2325   LACTATE  --  1.4   POCLAC 1.10  --      Vitals:     Vitals:    04/04/25 0712 04/04/25 0800 04/04/25 1049   BP: 107/66  130/80   Pulse: 105 105 83   Resp: 18  18   Temp: 98.4 °F (36.9 °C)  97.5 °F (36.4 °C)   SpO2: 97%  98%   Fluid challenge Fluid Not Needed - Patient is not hypotensive and/or lactate is less than 4.0..  Post- resuscitation assessment: No - Post resuscitation assessment not needed   My overall impression is sepsis.      Antibiotics (From admission, onward)     Antibiotics (From admission, onward)      Start     Stop Route Frequency Ordered    04/04/25 0400  piperacillin-tazobactam (ZOSYN) 4.5 g in D5W 100 mL IVPB (MB+)         -- IV Every 8 hours (non-standard times) 04/03/25 2142          - General Surgery consulted as well as GI -- on zosyn, stool studies sent  - continue with CLD, pain is improving  CT A/P: Extensive inflammatory changes in the right lower quadrant involving the cecum and distal/terminal ileum.  Questionable abnormal dilated appendix seen in the region.    This patient does have evidence of infective focus: Abdominal.  Organ dysfunction not indicated.  Latest lactate reviewed:   Recent Labs     Recent Labs   Lab 04/03/25 1821 04/03/25 2325   LACTATE  --  1.4   POCLAC 1.10  --      Vitals:    04/04/25 0712 04/04/25 0800 04/04/25 1049   BP: 107/66  130/80   Pulse: 105 105 83   Resp: 18  18   Temp: 98.4 °F (36.9 °C)  97.5 °F (36.4 °C)   SpO2: 97%  98%   My overall impression is sepsis.    Monitor BP closely.  Will give further fluids as safely tolerated while taking care not to overhydrate.  If pt unable to maintain MAP goal of 65+,  then will start vasopressors (eg peripheral Levophed) and contact the ICU  Morphine for pain.  Avoiding NSAIDs until Crohn's ruled out since they are known to worsen flares.  Source control achieved by:  GSGY consulted; anticipate IV ABX to help calm the area down prior to any operation  GI consulted; also needs to have the area calm down prior to any C-scope to eval for Crohn's    Antibiotics (From admission, onward)       Antibiotics (From admission, onward)      Start     Stop Route Frequency Ordered    04/04/25 0400  piperacillin-tazobactam (ZOSYN) 4.5 g in D5W 100 mL IVPB (MB+)         -- IV Every 8 hours (non-standard times) 04/03/25 7425

## 2025-04-04 NOTE — ASSESSMENT & PLAN NOTE
CT A/P: Extensive inflammatory changes in the right lower quadrant involving the cecum and distal/terminal ileum.  Questionable abnormal dilated appendix seen in the region.    This patient does have evidence of infective focus: Abdominal.  Organ dysfunction not indicated.  Latest lactate reviewed:   Recent Labs   Lab 04/03/25  1821 04/03/25  2325   LACTATE  --  1.4   POCLAC 1.10  --      Vitals:    04/03/25 2356 04/03/25 2357 04/04/25 0003   BP:  (!) 106/57    Pulse:  106 107   Resp: 18 18    Temp:  98.6 °F (37 °C)    SpO2:  100%      Fluid challenge Fluid Not Needed - Patient is not hypotensive and/or lactate is less than 4.0..  Post- resuscitation assessment: No - Post resuscitation assessment not needed   My overall impression is sepsis.    Monitor BP closely.  Will give further fluids as safely tolerated while taking care not to overhydrate.  If pt unable to maintain MAP goal of 65+, then will start vasopressors (eg peripheral Levophed) and contact the ICU  Morphine for pain.  Avoiding NSAIDs until Crohn's ruled out since they are known to worsen flares.  Source control achieved by:  GSGY consulted; anticipate IV ABX to help calm the area down prior to any operation  GI consulted; also needs to have the area calm down prior to any C-scope to eval for Crohn's    Antibiotics (From admission, onward)      Start     Stop Route Frequency Ordered    04/04/25 0400  piperacillin-tazobactam (ZOSYN) 4.5 g in D5W 100 mL IVPB (MB+)         -- IV Every 8 hours (non-standard times) 04/03/25 0288

## 2025-04-04 NOTE — CONSULTS
[facility] Consultation Note    Patient Complaint: RLQ pain    PCP:   Keyshawn Mitchell       LOS: 1        Initial History of Present Illness (HPI):  This is a 34 y.o. male consulted to GI service for Terminal ileitis per CT. PMH anxiety. Patient complaint of acute worsening of RLQ pain with associated symptoms of fever, nausea and constipation that began last Friday and worsened this week. Denies sob, vomiting, dizziness, lightheadedness, BRBPR, melena or diarrhea. Reports his wife had similar symptoms of abdominal pain that resolved independently. Denies any recent foreign travel, khadijah, eating under cooked food. Former smoker, occasional drinker.  Denies any personal or family hx of UC or Crohn's.    Temp overnight 101.6, hr 126 on admit, wbc 26, bili 1.4    Medical History: anxiety, former smoker    Surgical History:  has no past surgical history on file.      Objective Findings:    Vital Signs:  Temp:  [97.5 °F (36.4 °C)-101.6 °F (38.7 °C)]   Pulse:  []   Resp:  [18-20]   BP: (106-132)/(57-80)   SpO2:  [97 %-100 %]   Body mass index is 30.13 kg/m².      Physical Exam  Vitals and nursing note reviewed.   Constitutional:       Appearance: Normal appearance.   HENT:      Head: Normocephalic.   Pulmonary:      Effort: Pulmonary effort is normal.   Abdominal:      General: Bowel sounds are normal.      Palpations: Abdomen is soft.      Tenderness: There is abdominal tenderness (RLQ).   Skin:     General: Skin is warm and dry.   Neurological:      Mental Status: He is alert and oriented to person, place, and time.   Psychiatric:         Mood and Affect: Mood normal.         Behavior: Behavior normal.         Thought Content: Thought content normal.         Judgment: Judgment normal.               Labs:  Lab Results   Component Value Date    WBC 25.38 (H) 04/04/2025    HGB 12.8 (L) 04/04/2025    HCT 39.2 (L) 04/04/2025     04/04/2025    CHOL 250 (H) 04/16/2024    TRIG 99 04/16/2024    HDL  43 2024    ALT 28 2025    AST 17 2025     (L) 2025    K 3.7 2025     2025    CREATININE 0.9 2025    BUN 11 2025    CO2 21 (L) 2025    TSH 1.159 2024    HGBA1C 5.2 2024             Imagin/3/25 CT Abdomen pelvis-  Extensive inflammatory changes in the right lower quadrant involving the cecum and distal/terminal ileum.  Questionable abnormal dilated appendix seen in the region.  Findings may reflect acute appendicitis with reactive inflammatory changes involving the cecum and distal/terminal ileum.  Alternatively findings can be seen with potential infectious or inflammatory acute enterocolitis with reactive change involving the appendix.  No organized fluid collection, drainable abscess, or perforation seen.  Surgical consultation is recommended.  2. Mild hepatosplenomegaly.           RLQ abdominal pain. Leukocytosis. Anemia. Elevated bilirubin. Abnormal CT abdomen pelvis. Terminal ileitis. Appendicitis.   Plan/ Recommendations:  1.  Reports RLQ tenderness is still present, wbc elevated currently receiving zosyn. CT notes inflammation of TI and appendix. Patient reports spouse ill with abdominal pain at the same time but independently resolved. Blood clutures in process. Rec stool studies (culture, wbc, patho panel) possibly ileitis of infectious origin vs appendicitis. Gen surg consulted. Pending gen surg eval of appendix and stool study results, may suggest crohn's w/u.         Thank you so much for allowing us to participate in the care of Mao Todd Heather Perez . Please contact us if you have any additional questions.    Coco William NP  Gastroenterology  [facility] [place] [facility] S[facility]

## 2025-04-04 NOTE — PLAN OF CARE
Case Management Assessment     PCP: Keyshawn Mitchell MD    Pharmacy:   [id number] - [place], [place] - [address] [facility]  [address] [place] [id number]  Phone: [phone] Fax: [phone]    Patient Arrived From: Home  Existing Help at Home: pt is independent    Barriers to Discharge: None identified    Discharge Plan:    A. Home with family   B. Home    CM met with pt for dc planning assessment. Pt lives with his wife. He's independent with ADLs and uses no DME. He drove himself to the hospital and intends to drive himself home on discharge. CM will continue to follow.     04/04/25 1008   Discharge Assessment   Assessment Type Discharge Planning Assessment   Confirmed/corrected address, phone number and insurance Yes   Confirmed Demographics Correct on Facesheet   Source of Information patient   Communicated KEVIN with patient/caregiver Date not available/Unable to determine   People in Home spouse;child(alisson), dependent   Do you expect to return to your current living situation? Yes   Current cognitive status: Alert/Oriented   Walking or Climbing Stairs Difficulty no   Dressing/Bathing Difficulty no   Equipment Currently Used at Home none   Readmission within 30 days? No   Patient currently being followed by outpatient case management? No   Do you currently have service(s) that help you manage your care at home? No   Do you take prescription medications? Yes   Do you have prescription coverage? Yes   Coverage BCBS   Do you have any problems affording any of your prescribed medications? No   Is the patient taking medications as prescribed? yes   Who is going to help you get home at discharge? self   How do you get to doctors appointments? car, drives self   Are you on dialysis? No   Do you take coumadin? No   Discharge Plan A Home with family   Discharge Plan B Home   DME Needed Upon Discharge  none   Discharge Plan discussed with: Patient   Transition of Care Barriers None

## 2025-04-04 NOTE — PLAN OF CARE
Pt alert able to make needs known, tolerates medications well by mouth. IV antibiotics remain in progress, Repositioned self q 2hrs, pain controlled by PRN pain medication. Plan of care explained, diet tolerated, pt denies n,v,d. Remains free from falls and hospital acquired pressure injuries, safety maintained. Will continue following plan of care.      Problem: Adult Inpatient Plan of Care  Goal: Plan of Care Review  Outcome: Progressing  Goal: Patient-Specific Goal (Individualized)  Outcome: Progressing  Goal: Optimal Comfort and Wellbeing  Outcome: Progressing

## 2025-04-04 NOTE — PROGRESS NOTES
[facility] Medicine  Progress Note    Patient Name: Mao Romero Jr.  MRN: [id number]  Patient Class: IP- Inpatient   Admission Date: 4/3/2025  Length of Stay: 1 days  Attending Physician: Hugh Ortez MD  Primary Care Provider: Keyshawn Mitchell MD        Subjective     Principal Problem:Terminal ileitis        HPI:  Mao Romero Jr. is a 34 y.o. male with Anxiety who presented to [facility] ED for eval and treatment of RLQ pain.  They describe their pain as sharp, 8/10, located around McBurney's point without radiation elsewhere.  It started 5 days ago and has been steadily smoldering at a low level up until worsening acutely today, such that he had to leave work for eval at urgent care.   Patient denies any past surgeries on his abdomen. He has been taking ibuprofen and Pepto-Bismol with minimal relief. He denies any diarrhea, he states that his last bowel movement was this morning and was normal for him. He denies nausea, vomiting, chest pain, shortness of breath, weakness, numbness or tingling.  No notable recent healthcare encounters.    ED course: CT A/P W: Extensive inflammatory changes in the right lower quadrant involving the cecum and distal/terminal ileum.  Questionable abnormal dilated appendix seen in the region.  Febrile to 101.6 and tachycardic to 126 on arrival.  Exam with RLQ tenderness and guarding.  Labs WBC 26, TBili 1.4, UA with elevated bilirubin and urobilinogen.  ED staff d/w both GI and GSGY, who both recommended admission for IV ABX in order to calm the inflammation down before they could proceed with their respective evals and treatments.  Zosyn, NS, and morphine (good effect reported) given.  He was [facility] further evaluation and treatment.        Overview/Hospital Course:  No notes on file    Interval History: no acute issues, reports his pain is feeling better. Tolerating clear liquids. Had dark, liquid  stool earlier sent for testing.     Review of Systems  Objective:     Vital Signs (Most Recent):  Temp: 97.5 °F (36.4 °C) (04/04/25 1049)  Pulse: 83 (04/04/25 1049)  Resp: 18 (04/04/25 1049)  BP: 130/80 (04/04/25 1049)  SpO2: 98 % (04/04/25 1049) Vital Signs (24h Range):  Temp:  [97.5 °F (36.4 °C)-101.6 °F (38.7 °C)] 97.5 °F (36.4 °C)  Pulse:  [] 83  Resp:  [18-20] 18  SpO2:  [97 %-100 %] 98 %  BP: (106-132)/(57-80) 130/80     Weight: 98 kg (216 lb)  Body mass index is 30.13 kg/m².    Intake/Output Summary (Last 24 hours) at 4/4/2025 1540  Last data filed at 4/4/2025 1335  Gross per 24 hour   Intake 116.19 ml   Output --   Net 116.19 ml         Physical Exam  Constitutional:       General: He is not in acute distress.     Appearance: Normal appearance. He is not ill-appearing.   HENT:      Head: Normocephalic.   Cardiovascular:      Rate and Rhythm: Normal rate and regular rhythm.      Pulses: Normal pulses.      Heart sounds: Normal heart sounds.   Pulmonary:      Effort: Pulmonary effort is normal.      Breath sounds: Normal breath sounds.   Abdominal:      General: Abdomen is flat. Bowel sounds are normal.      Tenderness: There is abdominal tenderness. There is guarding and rebound.   Musculoskeletal:      Right lower leg: No edema.      Left lower leg: No edema.   Skin:     General: Skin is warm and dry.      Capillary Refill: Capillary refill takes less than 2 seconds.      Coloration: Skin is not jaundiced.   Neurological:      General: No focal deficit present.      Mental Status: He is alert and oriented to person, place, and time.   Psychiatric:         Mood and Affect: Mood normal.         Behavior: Behavior normal.               Significant Labs: All pertinent labs within the past 24 hours have been reviewed.    Significant Imaging: I have reviewed all pertinent imaging results/findings within the past 24 hours.      Assessment & Plan  Terminal ileitis  Sepsis without acute organ dysfunction  CT  A/P: Extensive inflammatory changes in the right lower quadrant involving the cecum and distal/terminal ileum.  Questionable abnormal dilated appendix seen in the region.    This patient does have evidence of infective focus: Abdominal.  Organ dysfunction not indicated.  Latest lactate reviewed:   Recent Labs   Lab 04/03/25 1821 04/03/25 2325   LACTATE  --  1.4   POCLAC 1.10  --      Vitals:     Vitals:    04/04/25 0712 04/04/25 0800 04/04/25 1049   BP: 107/66  130/80   Pulse: 105 105 83   Resp: 18  18   Temp: 98.4 °F (36.9 °C)  97.5 °F (36.4 °C)   SpO2: 97%  98%   Fluid challenge Fluid Not Needed - Patient is not hypotensive and/or lactate is less than 4.0..  Post- resuscitation assessment: No - Post resuscitation assessment not needed   My overall impression is sepsis.      Antibiotics (From admission, onward)     Antibiotics (From admission, onward)      Start     Stop Route Frequency Ordered    04/04/25 0400  piperacillin-tazobactam (ZOSYN) 4.5 g in D5W 100 mL IVPB (MB+)         -- IV Every 8 hours (non-standard times) 04/03/25 2142          - General Surgery consulted as well as GI -- on zosyn, stool studies sent  - continue with CLD, pain is improving  CT A/P: Extensive inflammatory changes in the right lower quadrant involving the cecum and distal/terminal ileum.  Questionable abnormal dilated appendix seen in the region.    This patient does have evidence of infective focus: Abdominal.  Organ dysfunction not indicated.  Latest lactate reviewed:   Recent Labs     Recent Labs   Lab 04/03/25 1821 04/03/25 2325   LACTATE  --  1.4   POCLAC 1.10  --      Vitals:    04/04/25 0712 04/04/25 0800 04/04/25 1049   BP: 107/66  130/80   Pulse: 105 105 83   Resp: 18  18   Temp: 98.4 °F (36.9 °C)  97.5 °F (36.4 °C)   SpO2: 97%  98%   My overall impression is sepsis.    Monitor BP closely.  Will give further fluids as safely tolerated while taking care not to overhydrate.  If pt unable to maintain MAP goal of 65+, then  will start vasopressors (eg peripheral Levophed) and contact the ICU  Morphine for pain.  Avoiding NSAIDs until Crohn's ruled out since they are known to worsen flares.  Source control achieved by:  GSGY consulted; anticipate IV ABX to help calm the area down prior to any operation  GI consulted; also needs to have the area calm down prior to any C-scope to eval for Crohn's    Antibiotics (From admission, onward)       Antibiotics (From admission, onward)      Start     Stop Route Frequency Ordered    04/04/25 0400  piperacillin-tazobactam (ZOSYN) 4.5 g in D5W 100 mL IVPB (MB+)         -- IV Every 8 hours (non-standard times) 04/03/25 2142          VTE Risk Mitigation (From admission, onward)           Ordered     IP VTE HIGH RISK PATIENT  Once         04/03/25 2149     Place sequential compression device  Until discontinued         04/03/25 2149     Reason for No Pharmacological VTE Prophylaxis  Once        Question:  Reasons:  Answer:  Patient is Ambulatory    04/03/25 2149                    Discharge Planning   KEVIN: 4/5/2025     Code Status: Full Code   Medical Readiness for Discharge Date:   Discharge Plan A: Home with family                        Hugh Ortez MD  Department of Hospital Medicine   [facility] Surg

## 2025-04-04 NOTE — H&P
[facility] Emergency Dept  [facility]  History & Physical    Patient Name: Mao Romero Jr.  MRN: [id number]  Patient Class: IP- Inpatient  Admission Date: 4/3/2025  Attending Physician: Anahy Mendoza, *   Primary Care Provider: Keyshawn Mitchell MD         Patient information was obtained from patient, parent, past medical records, and ER records.     Subjective:     Principal Problem:Terminal ileitis    Chief Complaint:   Chief Complaint   Patient presents with    Abdominal Pain     Pt complaining of worsening RLQ abdominal pain unrelieved by ibuprofen. Seen at [facility] and referred to ED for CT of appendix. 9/10 pain         HPI: Mao Romero Jr. is a 34 y.o. male with Anxiety who presented to [facility] ED for eval and treatment of RLQ pain.  They describe their pain as sharp, 8/10, located around McBurney's point without radiation elsewhere.  It started 5 days ago and has been steadily smoldering at a low level up until worsening acutely today, such that he had to leave work for eval at urgent care.   Patient denies any past surgeries on his abdomen. He has been taking ibuprofen and Pepto-Bismol with minimal relief. He denies any diarrhea, he states that his last bowel movement was this morning and was normal for him. He denies nausea, vomiting, chest pain, shortness of breath, weakness, numbness or tingling.  No notable recent healthcare encounters.    ED course: CT A/P W: Extensive inflammatory changes in the right lower quadrant involving the cecum and distal/terminal ileum.  Questionable abnormal dilated appendix seen in the region.  Febrile to 101.6 and tachycardic to 126 on arrival.  Exam with RLQ tenderness and guarding.  Labs WBC 26, TBili 1.4, UA with elevated bilirubin and urobilinogen.  ED staff d/w both GI and GSGY, who both recommended admission for IV ABX in order to calm the inflammation down before they could proceed with their respective evals and  treatments.  Zosyn, NS, and morphine (good effect reported) given.  He was admitted to [facility] for further evaluation and treatment.        History reviewed. No pertinent past medical history.    History reviewed. No pertinent surgical history.    Review of patient's allergies indicates:  No Known Allergies    No current facility-administered medications on file prior to encounter.     Current Outpatient Medications on File Prior to Encounter   Medication Sig    sertraline (ZOLOFT) 50 MG tablet TAKE 1 TABLET BY MOUTH EVERY DAY    [DISCONTINUED] cetirizine (ZYRTEC) 10 MG tablet Take 1 tablet (10 mg total) by mouth once daily. for 7 days    [DISCONTINUED] famotidine (PEPCID) 20 MG tablet Take 1 tablet (20 mg total) by mouth 2 (two) times daily. for 7 days     Family History       Problem Relation (Age of Onset)    Anxiety disorder Father, Sister    No Known Problems Mother, Son          Tobacco Use    Smoking status: Former     Current packs/day: 0.00     Average packs/day: 0.3 packs/day for 12.7 years (3.8 ttl pk-yrs)     Types: Cigarettes     Start date:      Quit date: 10/2022     Years since quittin.5    Smokeless tobacco: Never   Substance and Sexual Activity    Alcohol use: Not Currently     Alcohol/week: 3.0 standard drinks of alcohol     Types: 3 Cans of beer per week    Drug use: Never    Sexual activity: Yes     Partners: Female     Birth control/protection: None     Review of Systems   Reason unable to perform ROS: ROS was performed and pertinent +s and -s are listed in HPI.     Objective:     Vital Signs (Most Recent):  Temp: 98.9 °F (37.2 °C) (25)  Pulse: 108 (25)  Resp: 19 (25)  BP: 126/73 (25)  SpO2: 97 % (25) Vital Signs (24h Range):  Temp:  [98.9 °F (37.2 °C)-101.6 °F (38.7 °C)] 98.9 °F (37.2 °C)  Pulse:  [108-126] 108  Resp:  [18-20] 19  SpO2:  [97 %-99 %] 97 %  BP: (126-132)/(71-73) 126/73     Weight: 99.8 kg (220  lb)  Body mass index is 30.68 kg/m².     Physical Exam  Constitutional:       General: He is not in acute distress.     Appearance: Normal appearance. He is not ill-appearing.   HENT:      Head: Normocephalic.   Neck:      Comments: JVP not elevated  Cardiovascular:      Rate and Rhythm: Normal rate and regular rhythm.      Pulses: Normal pulses.      Heart sounds: Normal heart sounds.   Pulmonary:      Effort: Pulmonary effort is normal.      Breath sounds: Normal breath sounds.   Abdominal:      General: Abdomen is flat. Bowel sounds are normal.      Tenderness: There is abdominal tenderness. There is guarding and rebound.   Musculoskeletal:      Right lower leg: No edema.      Left lower leg: No edema.   Skin:     General: Skin is warm and dry.      Capillary Refill: Capillary refill takes less than 2 seconds.      Coloration: Skin is not jaundiced.   Neurological:      General: No focal deficit present.      Mental Status: He is alert and oriented to person, place, and time.   Psychiatric:         Mood and Affect: Mood normal.         Behavior: Behavior normal.                Significant Labs: All pertinent labs within the past 24 hours have been reviewed.  CBC:   Recent Labs   Lab 04/03/25  1816 04/03/25  1821   WBC 26.20*  --    HGB 15.7  --    HCT 46.9 52     --      CMP:   Recent Labs   Lab 04/03/25  1816   *   K 3.9      CO2 17*   BUN 12   CREATININE 0.9   CALCIUM 9.7   ALBUMIN 3.5   BILITOT 1.4*   ALKPHOS 128   AST 35   ALT 41   ANIONGAP 13     Urine Studies:   Recent Labs   Lab 04/03/25  1721 04/03/25  1828   COLORU Yellow Ariela   APPEARANCEUA  --  Clear   PHUR 7.0 6.0   SPECGRAV 1.020 1.030   PROTEINUA  --  1+*   GLUCUA  --  Negative   KETONESU 40*  --    BILIRUBINUA  --  1+*   OCCULTUA  --  1+*   NITRITE Negative Negative   UROBILINOGEN >=8.0* >=8.0*   LEUKOCYTESUR  --  Negative   RBCUA  --  2   WBCUA  --  2   BACTERIA  --  None   HYALINECASTS  --  0       Significant Imaging: I have  reviewed all pertinent imaging results/findings within the past 24 hours.    Results for orders placed or performed during the hospital encounter of 04/03/25 (from the past 2160 hours)   CT Abdomen Pelvis With IV Contrast NO Oral Contrast    Narrative    EXAMINATION:  CT ABDOMEN PELVIS WITH IV CONTRAST    CLINICAL HISTORY:  Abdominal pain, acute, nonlocalized;    TECHNIQUE:  Low dose axial images, sagittal and coronal reformations were obtained from the lung bases to the pubic symphysis following the IV administration of 75 mL of Omnipaque 350 .  Oral contrast was not given.    COMPARISON:  None.    FINDINGS:  The visualized portion of the heart is unremarkable.  The lung bases are clear.    Liver is enlarged measuring 20.5 cm.  No significant focal hepatic abnormalities are identified.  There is no intra-or extrahepatic biliary ductal dilatation.  The gallbladder is unremarkable.  The stomach, pancreas, and adrenal glands are unremarkable.  Spleen is mildly enlarged measuring 13 cm.    Kidneys enhance normally with no evidence of hydronephrosis.  No abnormalities are seen along the ureteral courses.  Urinary bladder is nondistended.  Prostate is unremarkable.    Normal appendix is not visualized.  Extensive inflammatory changes are seen involving the right lower quadrant and involving the cecum and distal/terminal ileum.  Questionable abnormal dilated appendix is seen measuring 1.3 cm in caliber with increased mucosal enhancement.  No evidence of perforation or free air.  There is trace free fluid in the region and extending into the lower pelvis.  No evidence of bowel obstruction.    Aorta tapers normally.    No acute osseous abnormality identified. Small fat containing inguinal hernias are seen, right larger than left.      Impression    1. Extensive inflammatory changes in the right lower quadrant involving the cecum and distal/terminal ileum.  Questionable abnormal dilated appendix seen in the region.  Findings  may reflect acute appendicitis with reactive inflammatory changes involving the cecum and distal/terminal ileum.  Alternatively findings can be seen with potential infectious or inflammatory acute enterocolitis with reactive change involving the appendix.  No organized fluid collection, drainable abscess, or perforation seen.  Surgical consultation is recommended.  2. Mild hepatosplenomegaly.  This report was flagged in Epic as abnormal.      Electronically signed by: Natividad Beard MD  Date:    04/03/2025  Time:    19:42       Assessment/Plan:     Assessment & Plan  Terminal ileitis  Sepsis without acute organ dysfunction  CT A/P: Extensive inflammatory changes in the right lower quadrant involving the cecum and distal/terminal ileum.  Questionable abnormal dilated appendix seen in the region.    This patient does have evidence of infective focus: Abdominal.  Organ dysfunction not indicated.  Latest lactate reviewed:   Recent Labs   Lab 04/03/25  1821 04/03/25  2325   LACTATE  --  1.4   POCLAC 1.10  --      Vitals:    04/03/25 2356 04/03/25 2357 04/04/25 0003   BP:  (!) 106/57    Pulse:  106 107   Resp: 18 18    Temp:  98.6 °F (37 °C)    SpO2:  100%      Fluid challenge Fluid Not Needed - Patient is not hypotensive and/or lactate is less than 4.0..  Post- resuscitation assessment: No - Post resuscitation assessment not needed   My overall impression is sepsis.    Monitor BP closely.  Will give further fluids as safely tolerated while taking care not to overhydrate.  If pt unable to maintain MAP goal of 65+, then will start vasopressors (eg peripheral Levophed) and contact the ICU  Morphine for pain.  Avoiding NSAIDs until Crohn's ruled out since they are known to worsen flares.  Source control achieved by:  GSGY consulted; anticipate IV ABX to help calm the area down prior to any operation  GI consulted; also needs to have the area calm down prior to any C-scope to eval for Crohn's    Antibiotics (From admission,  onward)      Start     Stop Route Frequency Ordered    04/04/25 0400  piperacillin-tazobactam (ZOSYN) 4.5 g in D5W 100 mL IVPB (MB+)         -- IV Every 8 hours (non-standard times) 04/03/25 2142               VTE Risk Mitigation (From admission, onward)           Ordered     IP VTE HIGH RISK PATIENT  Once         04/03/25 2149     Place sequential compression device  Until discontinued         04/03/25 2149     Reason for No Pharmacological VTE Prophylaxis  Once        Question:  Reasons:  Answer:  Patient is Ambulatory    04/03/25 2149                                    Renaldo Fuller MD  Department of Hospital Medicine  [facility] - Emergency Dept

## 2025-04-04 NOTE — HPI
Mao Romero Jr. is a 34 y.o. male with Anxiety who presented to [facility] ED for eval and treatment of RLQ pain.  They describe their pain as sharp, 8/10, located around McBurney's point without radiation elsewhere.  It started 5 days ago and has been steadily smoldering at a low level up until worsening acutely today, such that he had to leave work for eval at urgent care.   Patient denies any past surgeries on his abdomen. He has been taking ibuprofen and Pepto-Bismol with minimal relief. He denies any diarrhea, he states that his last bowel movement was this morning and was normal for him. He denies nausea, vomiting, chest pain, shortness of breath, weakness, numbness or tingling.  No notable recent healthcare encounters.    ED course: CT A/P W: Extensive inflammatory changes in the right lower quadrant involving the cecum and distal/terminal ileum.  Questionable abnormal dilated appendix seen in the region.  Febrile to 101.6 and tachycardic to 126 on arrival.  Exam with RLQ tenderness and guarding.  Labs WBC 26, TBili 1.4, UA with elevated bilirubin and urobilinogen.  ED staff d/w both GI and GSGY, who both recommended admission for IV ABX in order to calm the inflammation down before they could proceed with their respective evals and treatments.  Zosyn, NS, and morphine (good effect reported) given.  He was admitted to [facility] for further evaluation and treatment.

## 2025-04-05 LAB
ABSOLUTE EOSINOPHIL (OHS): 0.28 K/UL
ABSOLUTE MONOCYTE (OHS): 1.3 K/UL (ref 0.3–1)
ABSOLUTE NEUTROPHIL COUNT (OHS): 7.84 K/UL (ref 1.8–7.7)
ALBUMIN SERPL BCP-MCNC: 2.9 G/DL (ref 3.5–5.2)
ALP SERPL-CCNC: 97 UNIT/L (ref 40–150)
ALT SERPL W/O P-5'-P-CCNC: 31 UNIT/L (ref 10–44)
ANION GAP (OHS): 11 MMOL/L (ref 8–16)
AST SERPL-CCNC: 20 UNIT/L (ref 11–45)
BASOPHILS # BLD AUTO: 0.05 K/UL
BASOPHILS NFR BLD AUTO: 0.4 %
BILIRUB SERPL-MCNC: 0.6 MG/DL (ref 0.1–1)
BUN SERPL-MCNC: 10 MG/DL (ref 6–20)
CALCIUM SERPL-MCNC: 9.2 MG/DL (ref 8.7–10.5)
CHLORIDE SERPL-SCNC: 105 MMOL/L (ref 95–110)
CO2 SERPL-SCNC: 21 MMOL/L (ref 23–29)
CREAT SERPL-MCNC: 0.8 MG/DL (ref 0.5–1.4)
ERYTHROCYTE [DISTWIDTH] IN BLOOD BY AUTOMATED COUNT: 13 % (ref 11.5–14.5)
GFR SERPLBLD CREATININE-BSD FMLA CKD-EPI: >60 ML/MIN/1.73/M2
GLUCOSE SERPL-MCNC: 87 MG/DL (ref 70–110)
HCT VFR BLD AUTO: 44.3 % (ref 40–54)
HGB BLD-MCNC: 14.4 GM/DL (ref 14–18)
IMM GRANULOCYTES # BLD AUTO: 0.12 K/UL (ref 0–0.04)
IMM GRANULOCYTES NFR BLD AUTO: 1 % (ref 0–0.5)
LYMPHOCYTES # BLD AUTO: 2.24 K/UL (ref 1–4.8)
MAGNESIUM SERPL-MCNC: 2.2 MG/DL (ref 1.6–2.6)
MCH RBC QN AUTO: 28.1 PG (ref 27–31)
MCHC RBC AUTO-ENTMCNC: 32.5 G/DL (ref 32–36)
MCV RBC AUTO: 87 FL (ref 82–98)
NUCLEATED RBC (/100WBC) (OHS): 0 /100 WBC
PHOSPHATE SERPL-MCNC: 3.2 MG/DL (ref 2.7–4.5)
PLATELET # BLD AUTO: 372 K/UL (ref 150–450)
PMV BLD AUTO: 8.8 FL (ref 9.2–12.9)
POTASSIUM SERPL-SCNC: 4.1 MMOL/L (ref 3.5–5.1)
PROT SERPL-MCNC: 7.7 GM/DL (ref 6–8.4)
RBC # BLD AUTO: 5.12 M/UL (ref 4.6–6.2)
RELATIVE EOSINOPHIL (OHS): 2.4 %
RELATIVE LYMPHOCYTE (OHS): 18.9 % (ref 18–48)
RELATIVE MONOCYTE (OHS): 11 % (ref 4–15)
RELATIVE NEUTROPHIL (OHS): 66.3 % (ref 38–73)
SODIUM SERPL-SCNC: 137 MMOL/L (ref 136–145)
WBC # BLD AUTO: 11.83 K/UL (ref 3.9–12.7)

## 2025-04-05 PROCEDURE — 25000003 PHARM REV CODE 250

## 2025-04-05 PROCEDURE — 85025 COMPLETE CBC W/AUTO DIFF WBC: CPT

## 2025-04-05 PROCEDURE — 63600175 PHARM REV CODE 636 W HCPCS

## 2025-04-05 PROCEDURE — 36415 COLL VENOUS BLD VENIPUNCTURE: CPT

## 2025-04-05 PROCEDURE — 11000001 HC ACUTE MED/SURG PRIVATE ROOM

## 2025-04-05 PROCEDURE — 80053 COMPREHEN METABOLIC PANEL: CPT

## 2025-04-05 PROCEDURE — 99232 SBSQ HOSP IP/OBS MODERATE 35: CPT | Mod: ,,, | Performed by: STUDENT IN AN ORGANIZED HEALTH CARE EDUCATION/TRAINING PROGRAM

## 2025-04-05 PROCEDURE — 27000207 HC ISOLATION

## 2025-04-05 PROCEDURE — 84100 ASSAY OF PHOSPHORUS: CPT

## 2025-04-05 PROCEDURE — 83735 ASSAY OF MAGNESIUM: CPT

## 2025-04-05 RX ADMIN — PIPERACILLIN SODIUM AND TAZOBACTAM SODIUM 4.5 G: 4; .5 INJECTION, POWDER, FOR SOLUTION INTRAVENOUS at 08:04

## 2025-04-05 RX ADMIN — PIPERACILLIN SODIUM AND TAZOBACTAM SODIUM 4.5 G: 4; .5 INJECTION, POWDER, FOR SOLUTION INTRAVENOUS at 11:04

## 2025-04-05 RX ADMIN — PIPERACILLIN SODIUM AND TAZOBACTAM SODIUM 4.5 G: 4; .5 INJECTION, POWDER, FOR SOLUTION INTRAVENOUS at 03:04

## 2025-04-05 NOTE — NURSING
Vitals assessed. Antibiotic infusing. No complaints. Safety measures maintained. Will cont to monitor

## 2025-04-05 NOTE — PROGRESS NOTES
[facility]  Progress Note    Patient Name: Mao Romero Jr.  MRN: [id number]  Patient Class: IP- Inpatient   Admission Date: 4/3/2025  Length of Stay: 2 days  Attending Physician: Hugh Ortez MD  Primary Care Provider: Keyshawn Mitchell MD        Subjective     Principal Problem:Terminal ileitis        HPI:  Mao Romero Jr. is a 34 y.o. male with Anxiety who presented to [facility] ED for eval and treatment of RLQ pain.  They describe their pain as sharp, 8/10, located around McBurney's point without radiation elsewhere.  It started 5 days ago and has been steadily smoldering at a low level up until worsening acutely today, such that he had to leave work for eval at urgent care.   Patient denies any past surgeries on his abdomen. He has been taking ibuprofen and Pepto-Bismol with minimal relief. He denies any diarrhea, he states that his last bowel movement was this morning and was normal for him. He denies nausea, vomiting, chest pain, shortness of breath, weakness, numbness or tingling.  No notable recent healthcare encounters.    ED course: CT A/P W: Extensive inflammatory changes in the right lower quadrant involving the cecum and distal/terminal ileum.  Questionable abnormal dilated appendix seen in the region.  Febrile to 101.6 and tachycardic to 126 on arrival.  Exam with RLQ tenderness and guarding.  Labs WBC 26, TBili 1.4, UA with elevated bilirubin and urobilinogen.  ED staff d/w both GI and GSGY, who both recommended admission for IV ABX in order to calm the inflammation down before they could proceed with their respective evals and treatments.  Zosyn, NS, and morphine (good effect reported) given.  He was admitted to [facility] for further evaluation and treatment.        Overview/Hospital Course:  Mr. Romero is a 35 yo M who presented with sepsis due to terminal ileitis vs appendicitis. General Surgery and GI consulted. On  Zosyn. Do not suspect this is appendicitis, likely ileitis. Pain is slowly improving. WBC now normalized. Tolerating clears. Reports no pain at all today, even on exam. Per surgery recommendations, advance only when WBC normal and pain essentially resolved. Trial of regular diet for lunch.    Interval History: reports feeling much better. No pain at all today, even on exam with palpation. Reports mild discomfort but does not describe it as pain. No further liquid bowel movements. WBC normalized today. Stool culture negative thus far.    Review of Systems  Objective:     Vital Signs (Most Recent):  Temp: 97.5 °F (36.4 °C) (04/05/25 1044)  Pulse: 76 (04/05/25 1044)  Resp: 18 (04/05/25 1044)  BP: 124/76 (04/05/25 1044)  SpO2: 98 % (04/05/25 1044) Vital Signs (24h Range):  Temp:  [97.4 °F (36.3 °C)-98.2 °F (36.8 °C)] 97.5 °F (36.4 °C)  Pulse:  [76-91] 76  Resp:  [18] 18  SpO2:  [97 %-99 %] 98 %  BP: (114-146)/(72-80) 124/76     Weight: 98 kg (216 lb)  Body mass index is 30.13 kg/m².    Intake/Output Summary (Last 24 hours) at 4/5/2025 1107  Last data filed at 4/5/2025 0410  Gross per 24 hour   Intake 50 ml   Output --   Net 50 ml         Physical Exam  Constitutional:       General: He is not in acute distress.     Appearance: Normal appearance. He is not ill-appearing.   HENT:      Head: Normocephalic.   Cardiovascular:      Rate and Rhythm: Normal rate and regular rhythm.      Pulses: Normal pulses.      Heart sounds: Normal heart sounds.   Pulmonary:      Effort: Pulmonary effort is normal.      Breath sounds: Normal breath sounds.   Abdominal:      General: Abdomen is flat. Bowel sounds are normal.      Tenderness: There is no abdominal tenderness. There is no guarding or rebound.      Comments: Reports no tenderness to palpation on exam   Musculoskeletal:      Right lower leg: No edema.      Left lower leg: No edema.   Skin:     General: Skin is warm and dry.      Coloration: Skin is not jaundiced.   Neurological:       General: No focal deficit present.      Mental Status: He is alert and oriented to person, place, and time.   Psychiatric:         Mood and Affect: Mood normal.         Behavior: Behavior normal.               Significant Labs: All pertinent labs within the past 24 hours have been reviewed.    Significant Imaging: I have reviewed all pertinent imaging results/findings within the past 24 hours.      Assessment & Plan  Terminal ileitis  Sepsis without acute organ dysfunction  See sepsis  - will need outpatient colonoscopy in 4-6 weeks    CT A/P: Extensive inflammatory changes in the right lower quadrant involving the cecum and distal/terminal ileum.  Questionable abnormal dilated appendix seen in the region.    This patient does have evidence of infective focus: Abdominal.  Organ dysfunction not indicated.  Latest lactate reviewed. Normal.     Antibiotics (From admission, onward)      Start     Stop Route Frequency Ordered    04/04/25 0400  piperacillin-tazobactam (ZOSYN) 4.5 g in D5W 100 mL IVPB (MB+)         -- IV Every 8 hours (non-standard times) 04/03/25 2142          - General Surgery consulted as well as GI -- on zosyn, stool studies sent  - continue with CLD, pain is improving. Reports essentially no pain today, mild discomfort. WBC normalized.  - advance diet today         VTE Risk Mitigation (From admission, onward)           Ordered     IP VTE HIGH RISK PATIENT  Once         04/03/25 2149     Place sequential compression device  Until discontinued         04/03/25 2149     Reason for No Pharmacological VTE Prophylaxis  Once        Question:  Reasons:  Answer:  Patient is Ambulatory    04/03/25 2149                    Discharge Planning   KEVIN: 4/5/2025     Code Status: Full Code   Medical Readiness for Discharge Date:   Discharge Plan A: Home with family                        Hugh Ortez MD  Department of Hospital Medicine   [facility]

## 2025-04-05 NOTE — NURSING
Received handoff from POLLY Marion. Pt resting in bed watching tv with family member at bedside. No complaints. IV saline lock. Safety measures maintained. Will cont to monitor

## 2025-04-05 NOTE — PROGRESS NOTES
GI Progress Note - 4/5/2025   See GI consult note for full H&P      Subjective:   Patient seen and examined at bedside. Leukocytosis improved today. Afebrile. He is feeling much better.       Objective:    Vital signs in last 24 hours:  Temp:  [97.4 °F (36.3 °C)-98.2 °F (36.8 °C)] 97.7 °F (36.5 °C)  Pulse:  [79-91] 85  Resp:  [18] 18  SpO2:  [97 %-99 %] 98 %  BP: (114-146)/(72-80) 114/72    Intake/Output last 3 shifts:  I/O last 3 completed shifts:  In: 116.2 [P.O.:50; IV Piggyback:66.2]  Out: -   Intake/Output this shift:  No intake/output data recorded.    Gen: no apparent distress, appears stated age  Heart: RRR, no murmurs, rub or gallops appreciated  Lung: No w/r/c. CTA bilaterally   Abdomen: NTND, BS present, soft   Ext: 2+ pulses, no lower ext. edema  Neuro: A&O X 3       Recent Labs   Lab 04/03/25 1816 04/03/25  1821 04/04/25  0436 04/05/25  0548   WBC 26.20*  --  25.38* 11.83   HGB 15.7  --  12.8* 14.4   HCT 46.9 52 39.2* 44.3     --  350 372   MCV 86  --  87 87      Recent Labs   Lab 04/03/25 1816 04/04/25  0436 04/05/25  0547   * 135* 137   K 3.9 3.7 4.1    105 105   CO2 17* 21* 21*   BUN 12 11 10   CALCIUM 9.7 8.6* 9.2   PHOS  --  3.0 3.2     Recent Labs   Lab 04/03/25 1816 04/04/25  0436 04/05/25  0547   AST 35 17 20   ALT 41 28 31   ALKPHOS 128 120 97   BILITOT 1.4* 1.1* 0.6       Scheduled Meds:   piperacillin-tazobactam (Zosyn) IV (PEDS and ADULTS) (extended infusion is not appropriate)  4.5 g Intravenous Q8H    polyethylene glycol  17 g Oral Daily     Continuous Infusions:    CT    Impression:     1. Extensive inflammatory changes in the right lower quadrant involving the cecum and distal/terminal ileum.  Questionable abnormal dilated appendix seen in the region.  Findings may reflect acute appendicitis with reactive inflammatory changes involving the cecum and distal/terminal ileum.  Alternatively findings can be seen with potential infectious or inflammatory acute  enterocolitis with reactive change involving the appendix.  No organized fluid collection, drainable abscess, or perforation seen.  Surgical consultation is recommended.  2. Mild hepatosplenomegaly.  This report was flagged in Epic as abnormal.       Assessment:    Terminal ileitis   LILLIAN     Plan:    - Follow results of stool testing, so far negative except WBC in stool. Calpro pending   - Clinically much improved today   - Primary team planning for possible discharge tomorrow   - Plan for EGD/cscope as outpatient unless symptoms worsen   - will continue to follow       Karyn Doll   Gastroenterology   [facility]

## 2025-04-05 NOTE — ASSESSMENT & PLAN NOTE
See sepsis  - will need outpatient colonoscopy in 4-6 weeks    CT A/P: Extensive inflammatory changes in the right lower quadrant involving the cecum and distal/terminal ileum.  Questionable abnormal dilated appendix seen in the region.    This patient does have evidence of infective focus: Abdominal.  Organ dysfunction not indicated.  Latest lactate reviewed. Normal.     Antibiotics (From admission, onward)      Start     Stop Route Frequency Ordered    04/04/25 0400  piperacillin-tazobactam (ZOSYN) 4.5 g in D5W 100 mL IVPB (MB+)         -- IV Every 8 hours (non-standard times) 04/03/25 2142          - General Surgery consulted as well as GI -- on zosyn, stool studies sent  - continue with CLD, pain is improving. Reports essentially no pain today, mild discomfort. WBC normalized.  - advance diet today

## 2025-04-05 NOTE — NURSING
[facility]  Nurses Note -- 4 Eyes      4/5/2025       Skin assessed on: Q Shift      [x] No Pressure Injuries Present    [x]Prevention Measures Documented    [] Yes LDA  for Pressure Injury Previously documented     [] Yes New Pressure Injury Discovered   [] LDA for New Pressure Injury Added      Attending RN:  Libby Bunch, RN     Second RN:  Christiane Padilla

## 2025-04-05 NOTE — PROGRESS NOTES
"General Surgery Progress Note    Patient states his pain has much improved. He is currently eating a sandwich and tolerating it well. No nausea or vomiting. Still passing flatus.    /76 (BP Location: Right arm, Patient Position: Lying)   Pulse 76   Temp 97.5 °F (36.4 °C) (Oral)   Resp 18   Ht 5' 11" (1.803 m)   Wt 98 kg (216 lb)   SpO2 98%   BMI 30.13 kg/m²     No acute distress, appears well  Abdomen soft, nondistended. Very minimally tender in RLQ, much improved from prior exam    Component      Latest Ref Rng 4/3/2025 4/4/2025 4/5/2025   WBC      3.90 - 12.70 K/uL 26.20 (H)  25.38 (H)  11.83    RBC      4.60 - 6.20 M/uL 5.44  4.49 (L)  5.12    Hemoglobin      14.0 - 18.0 gm/dL 15.7  12.8 (L)  14.4    Hematocrit      40.0 - 54.0 % 46.9  39.2 (L)  44.3    MCV      82 - 98 fL 86  87  87    MCH      27.0 - 31.0 pg 28.9  28.5  28.1    MCHC      32.0 - 36.0 g/dL 33.5  32.7  32.5    RDW      11.5 - 14.5 % 13.0  13.2  13.0    Platelet Count      150 - 450 K/uL 388  350  372    MPV      9.2 - 12.9 fL 9.0 (L)  9.0 (L)  8.8 (L)    nRBC      <=0 /100 WBC 0  0  0    Gran # (ANC)      1.8 - 7.7 K/uL 20.4  19.34 (H)  7.84 (H)    Lymph %      18 - 48 % 13.0 (L)  10.0 (L)  18.9    Mono %      4 - 15 % 8.0  12.2  11.0    Basophil %      <=1.9 % 1.0  0.3  0.4    Neut %      38 - 73 %  76.1 (H)  66.3    Eos %      <=8 %  0.6  2.4    Immature Granulocytes      0.0 - 0.5 %  0.8 (H)  1.0 (H)    Lymph #      1 - 4.8 K/uL  2.53  2.24    Mono #      0.3 - 1 K/uL  3.09 (H)  1.30 (H)    Eos #      <=0.5 K/uL  0.14  0.28    Baso #      <=0.2 K/uL  0.07  0.05    Immature Grans (Abs)      0.00 - 0.04 K/uL  0.21 (H)  0.12 (H)       Legend:  (H) High  (L) Low    34 year old man presents with RLQ abdominal pain and fevers.  CT scan demonstrated terminal ileitis as well as inflammation of cecum and appendix     PLAN:  Plan    -regular diet  -leukocytosis markedly improved  -if continues to feel well and tolerate diet, ok with " discharge  -close GI follow up on discharge for eventual scope    Luiz Arevalo MD   General Surgery  [facility]

## 2025-04-05 NOTE — HOSPITAL COURSE
Mr. Romero is a 33 yo M who presented with sepsis due to terminal ileitis vs appendicitis. General Surgery and GI consulted. On Zosyn. Do not suspect this is appendicitis, likely ileitis. Pain is slowly improving. WBC now normalized. Tolerating clears. Reports no pain at all today, even on exam. Per surgery recommendations, advance only when WBC normal and pain essentially resolved. Trial of regular diet for lunch. Tolerating diet, pain resolved and normal wbc.  Overall feeling better.  We will plan to discharge patient with course of Augmentin to complete antibiotics.  Referral placed for GI as he will need to have outpatient colonoscopy in 4-6 weeks.  All questions answered and patient discharged home in stable condition.  Return precautions given.

## 2025-04-05 NOTE — SUBJECTIVE & OBJECTIVE
Interval History: reports feeling much better. No pain at all today, even on exam with palpation. Reports mild discomfort but does not describe it as pain. No further liquid bowel movements. WBC normalized today. Stool culture negative thus far.    Review of Systems  Objective:     Vital Signs (Most Recent):  Temp: 97.5 °F (36.4 °C) (04/05/25 1044)  Pulse: 76 (04/05/25 1044)  Resp: 18 (04/05/25 1044)  BP: 124/76 (04/05/25 1044)  SpO2: 98 % (04/05/25 1044) Vital Signs (24h Range):  Temp:  [97.4 °F (36.3 °C)-98.2 °F (36.8 °C)] 97.5 °F (36.4 °C)  Pulse:  [76-91] 76  Resp:  [18] 18  SpO2:  [97 %-99 %] 98 %  BP: (114-146)/(72-80) 124/76     Weight: 98 kg (216 lb)  Body mass index is 30.13 kg/m².    Intake/Output Summary (Last 24 hours) at 4/5/2025 1107  Last data filed at 4/5/2025 0410  Gross per 24 hour   Intake 50 ml   Output --   Net 50 ml         Physical Exam  Constitutional:       General: He is not in acute distress.     Appearance: Normal appearance. He is not ill-appearing.   HENT:      Head: Normocephalic.   Cardiovascular:      Rate and Rhythm: Normal rate and regular rhythm.      Pulses: Normal pulses.      Heart sounds: Normal heart sounds.   Pulmonary:      Effort: Pulmonary effort is normal.      Breath sounds: Normal breath sounds.   Abdominal:      General: Abdomen is flat. Bowel sounds are normal.      Tenderness: There is no abdominal tenderness. There is no guarding or rebound.      Comments: Reports no tenderness to palpation on exam   Musculoskeletal:      Right lower leg: No edema.      Left lower leg: No edema.   Skin:     General: Skin is warm and dry.      Coloration: Skin is not jaundiced.   Neurological:      General: No focal deficit present.      Mental Status: He is alert and oriented to person, place, and time.   Psychiatric:         Mood and Affect: Mood normal.         Behavior: Behavior normal.               Significant Labs: All pertinent labs within the past 24 hours have been  reviewed.    Significant Imaging: I have reviewed all pertinent imaging results/findings within the past 24 hours.

## 2025-04-05 NOTE — ASSESSMENT & PLAN NOTE
[facility] called requesting a refill of the below medication which has been pended for you:     Requested Prescriptions     Pending Prescriptions Disp Refills    enalapril (VASOTEC) 20 MG tablet [Pharmacy Med Name: ENALAPRIL MALEATE 20MG TABS] 90 tablet 1     Sig: TAKE 1 TABLET BY MOUTH ONE TIME DAILY    metoprolol succinate (TOPROL XL) 25 MG extended release tablet [Pharmacy Med Name: METOPROLOL SUCCINATE ER 25MG TB24] 90 tablet 1     Sig: TAKE 1 TABLET BY MOUTH ONE TIME DAILY       Last Appointment Date: 1/18/2022  Next Appointment Date: 7/18/2022    Allergies   Allergen Reactions    Niaspan [Niacin] Hives See sepsis  - will need outpatient colonoscopy in 4-6 weeks    CT A/P: Extensive inflammatory changes in the right lower quadrant involving the cecum and distal/terminal ileum.  Questionable abnormal dilated appendix seen in the region.    This patient does have evidence of infective focus: Abdominal.  Organ dysfunction not indicated.  Latest lactate reviewed. Normal.     Antibiotics (From admission, onward)      Start     Stop Route Frequency Ordered    04/04/25 0400  piperacillin-tazobactam (ZOSYN) 4.5 g in D5W 100 mL IVPB (MB+)         -- IV Every 8 hours (non-standard times) 04/03/25 2142          - General Surgery consulted as well as GI -- on zosyn, stool studies sent  - continue with CLD, pain is improving. Reports essentially no pain today, mild discomfort. WBC normalized.  - advance diet today

## 2025-04-06 VITALS
SYSTOLIC BLOOD PRESSURE: 111 MMHG | HEART RATE: 74 BPM | OXYGEN SATURATION: 98 % | WEIGHT: 216 LBS | TEMPERATURE: 98 F | RESPIRATION RATE: 21 BRPM | DIASTOLIC BLOOD PRESSURE: 69 MMHG | BODY MASS INDEX: 30.24 KG/M2 | HEIGHT: 71 IN

## 2025-04-06 LAB
ABSOLUTE EOSINOPHIL (OHS): 0.39 K/UL
ABSOLUTE MONOCYTE (OHS): 0.93 K/UL (ref 0.3–1)
ABSOLUTE NEUTROPHIL COUNT (OHS): 6.27 K/UL (ref 1.8–7.7)
ADV 40+41 DNA STL QL NAA+NON-PROBE: NOT DETECTED
ALBUMIN SERPL BCP-MCNC: 2.8 G/DL (ref 3.5–5.2)
ALP SERPL-CCNC: 95 UNIT/L (ref 40–150)
ALT SERPL W/O P-5'-P-CCNC: 55 UNIT/L (ref 10–44)
ANION GAP (OHS): 9 MMOL/L (ref 8–16)
AST SERPL-CCNC: 35 UNIT/L (ref 11–45)
ASTRO TYP 1-8 RNA STL QL NAA+NON-PROBE: NOT DETECTED
BACTERIA STL CULT: NORMAL
BASOPHILS # BLD AUTO: 0.06 K/UL
BASOPHILS NFR BLD AUTO: 0.5 %
BILIRUB SERPL-MCNC: 0.3 MG/DL (ref 0.1–1)
BUN SERPL-MCNC: 12 MG/DL (ref 6–20)
C CAYETANENSIS DNA STL QL NAA+NON-PROBE: NOT DETECTED
C COLI+JEJ+UPSA DNA STL QL NAA+NON-PROBE: NOT DETECTED
CALCIUM SERPL-MCNC: 9.1 MG/DL (ref 8.7–10.5)
CHLORIDE SERPL-SCNC: 105 MMOL/L (ref 95–110)
CO2 SERPL-SCNC: 23 MMOL/L (ref 23–29)
CREAT SERPL-MCNC: 0.8 MG/DL (ref 0.5–1.4)
CRYPTOSP DNA STL QL NAA+NON-PROBE: NOT DETECTED
E COLI SXT1 STL QL IA: NEGATIVE
E COLI SXT2 STL QL IA: NEGATIVE
E HISTOLYT DNA STL QL NAA+NON-PROBE: NOT DETECTED
EAEC PAA PLAS AGGR+AATA ST NAA+NON-PRB: NOT DETECTED
EC STX1+STX2 GENES STL QL NAA+NON-PROBE: NOT DETECTED
EPEC EAE GENE STL QL NAA+NON-PROBE: NOT DETECTED
ERYTHROCYTE [DISTWIDTH] IN BLOOD BY AUTOMATED COUNT: 13 % (ref 11.5–14.5)
ETEC LTA+ST1A+ST1B TOX ST NAA+NON-PROBE: NOT DETECTED
G LAMBLIA DNA STL QL NAA+NON-PROBE: NOT DETECTED
GFR SERPLBLD CREATININE-BSD FMLA CKD-EPI: >60 ML/MIN/1.73/M2
GLUCOSE SERPL-MCNC: 89 MG/DL (ref 70–110)
HCT VFR BLD AUTO: 44.3 % (ref 40–54)
HGB BLD-MCNC: 14.7 GM/DL (ref 14–18)
IMM GRANULOCYTES # BLD AUTO: 0.21 K/UL (ref 0–0.04)
IMM GRANULOCYTES NFR BLD AUTO: 1.9 % (ref 0–0.5)
LYMPHOCYTES # BLD AUTO: 3.29 K/UL (ref 1–4.8)
MAGNESIUM SERPL-MCNC: 2.2 MG/DL (ref 1.6–2.6)
MCH RBC QN AUTO: 28.7 PG (ref 27–31)
MCHC RBC AUTO-ENTMCNC: 33.2 G/DL (ref 32–36)
MCV RBC AUTO: 86 FL (ref 82–98)
NOROVIRUS GI+II RNA STL QL NAA+NON-PROBE: NOT DETECTED
NUCLEATED RBC (/100WBC) (OHS): 0 /100 WBC
P SHIGELLOIDES DNA STL QL NAA+NON-PROBE: NOT DETECTED
PHOSPHATE SERPL-MCNC: 4 MG/DL (ref 2.7–4.5)
PLATELET # BLD AUTO: 460 K/UL (ref 150–450)
PMV BLD AUTO: 9 FL (ref 9.2–12.9)
POTASSIUM SERPL-SCNC: 3.9 MMOL/L (ref 3.5–5.1)
PROT SERPL-MCNC: 7.5 GM/DL (ref 6–8.4)
RBC # BLD AUTO: 5.13 M/UL (ref 4.6–6.2)
RELATIVE EOSINOPHIL (OHS): 3.5 %
RELATIVE LYMPHOCYTE (OHS): 29.5 % (ref 18–48)
RELATIVE MONOCYTE (OHS): 8.3 % (ref 4–15)
RELATIVE NEUTROPHIL (OHS): 56.3 % (ref 38–73)
RVA RNA STL QL NAA+NON-PROBE: NOT DETECTED
S ENT+BONG DNA STL QL NAA+NON-PROBE: NOT DETECTED
SAPO I+II+IV+V RNA STL QL NAA+NON-PROBE: NOT DETECTED
SHIGELLA SP+EIEC IPAH ST NAA+NON-PROBE: NOT DETECTED
SODIUM SERPL-SCNC: 137 MMOL/L (ref 136–145)
V CHOL+PARA+VUL DNA STL QL NAA+NON-PROBE: NOT DETECTED
V CHOLERAE DNA STL QL NAA+NON-PROBE: NOT DETECTED
WBC # BLD AUTO: 11.15 K/UL (ref 3.9–12.7)
Y ENTEROCOL DNA STL QL NAA+NON-PROBE: NOT DETECTED

## 2025-04-06 PROCEDURE — 84100 ASSAY OF PHOSPHORUS: CPT

## 2025-04-06 PROCEDURE — 82310 ASSAY OF CALCIUM: CPT

## 2025-04-06 PROCEDURE — 63600175 PHARM REV CODE 636 W HCPCS

## 2025-04-06 PROCEDURE — 36415 COLL VENOUS BLD VENIPUNCTURE: CPT

## 2025-04-06 PROCEDURE — 85025 COMPLETE CBC W/AUTO DIFF WBC: CPT

## 2025-04-06 PROCEDURE — 25000003 PHARM REV CODE 250

## 2025-04-06 PROCEDURE — 83735 ASSAY OF MAGNESIUM: CPT

## 2025-04-06 RX ORDER — AMOXICILLIN AND CLAVULANATE POTASSIUM 875; 125 MG/1; MG/1
1 TABLET, FILM COATED ORAL EVERY 12 HOURS
Qty: 14 TABLET | Refills: 0 | Status: SHIPPED | OUTPATIENT
Start: 2025-04-06 | End: 2025-04-13

## 2025-04-06 RX ADMIN — PIPERACILLIN SODIUM AND TAZOBACTAM SODIUM 4.5 G: 4; .5 INJECTION, POWDER, FOR SOLUTION INTRAVENOUS at 04:04

## 2025-04-06 NOTE — DISCHARGE INSTRUCTIONS
Our goal at [facility] is to always give you outstanding care and exceptional service. You may receive a survey from [employer] by mail, text or e-mail in the next 24-48 hours asking about the care you received with us. The survey should only take 5-10 minutes to complete and is very important to us.     Your feedback provides us with a way to recognize our staff who work tirelessly to provide the best care! Also, your responses help us learn how to improve when your experience was below our aspiration of excellence. We are always looking for ways to improve your stay. We WILL use your feedback to continue making improvements to help us provide the highest quality care. We keep your personal information and feedback confidential. We appreciate your time completing this survey and can't wait to hear from you!!!    We look forward to your continued care with us! Thanks so much for choosing [facility] for your healthcare needs!

## 2025-04-06 NOTE — PLAN OF CARE
Problem: Adult Inpatient Plan of Care  Goal: Plan of Care Review  Outcome: Adequate for Care Transition  Goal: Patient-Specific Goal (Individualized)  Outcome: Adequate for Care Transition  Goal: Absence of Hospital-Acquired Illness or Injury  Outcome: Adequate for Care Transition  Goal: Optimal Comfort and Wellbeing  Outcome: Adequate for Care Transition  Goal: Readiness for Transition of Care  Outcome: Adequate for Care Transition     Problem: Infection  Goal: Absence of Infection Signs and Symptoms  Outcome: Adequate for Care Transition     Problem: Sepsis/Septic Shock  Goal: Optimal Coping  Outcome: Adequate for Care Transition  Goal: Absence of Bleeding  Outcome: Adequate for Care Transition  Goal: Blood Glucose Level Within Targeted Range  Outcome: Adequate for Care Transition  Goal: Absence of Infection Signs and Symptoms  Outcome: Adequate for Care Transition  Goal: Optimal Nutrition Intake  Outcome: Adequate for Care Transition

## 2025-04-06 NOTE — NURSING
Pt discharged per MD order. IV removed. Catheter tip intact. No distress noted.  AVS given to pt . Vitals per chart. afebrile. No complaints of pain, N/V, diarrhea, or SOB. Pt awaiting virtual nurse

## 2025-04-06 NOTE — NURSING
[facility]  Nurses Note -- 4 Eyes      4/6/2025       Skin assessed on: Q Shift      [x] No Pressure Injuries Present    []Prevention Measures Documented    [] Yes LDA  for Pressure Injury Previously documented     [] Yes New Pressure Injury Discovered   [] LDA for New Pressure Injury Added      Attending RN:  Libby Bunch, POLLY     Second RN:  Lluvia Young

## 2025-04-06 NOTE — DISCHARGE SUMMARY
[facility]  Discharge Summary      Patient Name: Mao Romero Jr.  MRN: [id number]  MARIAN: [id number]  Patient Class: IP- Inpatient  Admission Date: 4/3/2025  Hospital Length of Stay: 3 days  Discharge Date and Time: 4/6/2025 11:28 AM  Attending Physician: No att. providers found   Discharging Provider: Hugh Ortez MD  Primary Care Provider: Keyshawn Mitchell MD    Primary Care Team: Networked reference to record PCT     HPI:   Mao Romero Jr. is a 34 y.o. male with Anxiety who presented to [facility] ED for eval and treatment of RLQ pain.  They describe their pain as sharp, 8/10, located around McBurney's point without radiation elsewhere.  It started 5 days ago and has been steadily smoldering at a low level up until worsening acutely today, such that he had to leave work for eval at urgent care.   Patient denies any past surgeries on his abdomen. He has been taking ibuprofen and Pepto-Bismol with minimal relief. He denies any diarrhea, he states that his last bowel movement was this morning and was normal for him. He denies nausea, vomiting, chest pain, shortness of breath, weakness, numbness or tingling.  No notable recent healthcare encounters.    ED course: CT A/P W: Extensive inflammatory changes in the right lower quadrant involving the cecum and distal/terminal ileum.  Questionable abnormal dilated appendix seen in the region.  Febrile to 101.6 and tachycardic to 126 on arrival.  Exam with RLQ tenderness and guarding.  Labs WBC 26, TBili 1.4, UA with elevated bilirubin and urobilinogen.  ED staff d/w both GI and GSGY, who both recommended admission for IV ABX in order to calm the inflammation down before they could proceed with their respective evals and treatments.  Zosyn, NS, and morphine (good effect reported) given.  He was admitted to [facility] for further evaluation and treatment.        * No surgery found *      Hospital Course:    Mr. Romero is a 35 yo M who presented with sepsis due to terminal ileitis vs appendicitis. General Surgery and GI consulted. On Zosyn. Do not suspect this is appendicitis, likely ileitis. Pain is slowly improving. WBC now normalized. Tolerating clears. Reports no pain at all today, even on exam. Per surgery recommendations, advance only when WBC normal and pain essentially resolved. Trial of regular diet for lunch. Tolerating diet, pain resolved and normal wbc.  Overall feeling better.  We will plan to discharge patient with course of Augmentin to complete antibiotics.  Referral placed for GI as he will need to have outpatient colonoscopy in 4-6 weeks.  All questions answered and patient discharged home in stable condition.  Return precautions given.     Goals of Care Treatment Preferences:  Code Status: Full Code      SDOH Screening:  The patient was screened for utility difficulties, food insecurity, transport difficulties, housing insecurity, and interpersonal safety and there were no concerns identified this admission.     Consults:   Consults (From admission, onward)          Status Ordering Provider     Inpatient consult to Gastroenterology  Once        Provider:  Coco William NP    Completed RASHEEDA BETANCOURT            Assessment & Plan  Terminal ileitis  Sepsis without acute organ dysfunction  See sepsis  - will need outpatient colonoscopy in 4-6 weeks    CT A/P: Extensive inflammatory changes in the right lower quadrant involving the cecum and distal/terminal ileum.  Questionable abnormal dilated appendix seen in the region.    This patient does have evidence of infective focus: Abdominal.  Organ dysfunction not indicated.  Latest lactate reviewed. Normal.     Antibiotics (From admission, onward)      None          - General Surgery consulted as well as GI -- on zosyn, stool studies sent  - continue with CLD, pain is improving. Reports essentially no pain today, mild discomfort. WBC normalized.  -  advance diet today         Final Active Diagnoses:    Diagnosis Date Noted POA    PRINCIPAL PROBLEM:  Terminal ileitis [K50.00] 04/03/2025 Yes    Sepsis without acute organ dysfunction [A41.9] 04/03/2025 Yes      Problems Resolved During this Admission:       Discharged Condition: stable    Disposition: Home or Self Care    Follow Up:   Follow-up Information       Coco William NP. Call.    Specialty: Gastroenterology  Why: Call to schedule a hospital follow-up within one week.  Contact information:  [address] [facility] [address]  [address]  [place] [id number] [phone]                           Patient Instructions:      Ambulatory referral/consult to Gastroenterology   Standing Status: Future   Referral Priority: Routine Referral Type: Consultation   Referral Reason: Specialty Services Required   Requested Specialty: Gastroenterology   Number of Visits Requested: 1     Diet Adult Regular     Notify your health care provider if you experience any of the following:  temperature >100.4     Notify your health care provider if you experience any of the following:  persistent nausea and vomiting or diarrhea     Notify your health care provider if you experience any of the following:  severe uncontrolled pain     Notify your health care provider if you experience any of the following:  difficulty breathing or increased cough     Notify your health care provider if you experience any of the following:  severe persistent headache     Notify your health care provider if you experience any of the following:  worsening rash     Notify your health care provider if you experience any of the following:  persistent dizziness, light-headedness, or visual disturbances     Notify your health care provider if you experience any of the following:  increased confusion or weakness     Activity as tolerated       Significant Diagnostic Studies: Labs: All labs within the past 24 hours have been reviewed  Microbiology: Blood Culture No results found  "for: "LABBLOO"    Pending Diagnostic Studies:       Procedure Component Value Units Date/Time    Calprotectin, Stool [[id number]] Collected: 04/04/25 1404    Order Status: Sent Lab Status: In process Updated: 04/04/25 1409    Specimen: Stool            Medications:  Reconciled Home Medications:      Medication List        START taking these medications      amoxicillin-clavulanate 875-125mg 875-125 mg per tablet  Commonly known as: AUGMENTIN  Take 1 tablet by mouth every 12 (twelve) hours. for 7 days            CONTINUE taking these medications      sertraline 50 MG tablet  Commonly known as: ZOLOFT  TAKE 1 TABLET BY MOUTH EVERY DAY              Indwelling Lines/Drains at time of discharge:   Lines/Drains/Airways       None                   Time spent on the discharge of patient: >35 minutes         Hugh Ortez MD  Department of Hospital Medicine  [facility] [place] [facility]  "

## 2025-04-06 NOTE — ASSESSMENT & PLAN NOTE
See sepsis  - will need outpatient colonoscopy in 4-6 weeks    CT A/P: Extensive inflammatory changes in the right lower quadrant involving the cecum and distal/terminal ileum.  Questionable abnormal dilated appendix seen in the region.    This patient does have evidence of infective focus: Abdominal.  Organ dysfunction not indicated.  Latest lactate reviewed. Normal.     Antibiotics (From admission, onward)      None          - General Surgery consulted as well as GI -- on zosyn, stool studies sent  - continue with CLD, pain is improving. Reports essentially no pain today, mild discomfort. WBC normalized.  - advance diet today

## 2025-04-06 NOTE — ASSESSMENT & PLAN NOTE
See sepsis  - will need outpatient colonoscopy in 4-6 weeks    CT A/P: Extensive inflammatory changes in the right lower quadrant involving the cecum and distal/terminal ileum.  Questionable abnormal dilated appendix seen in the region.    This patient does have evidence of infective focus: Abdominal.  Organ dysfunction not indicated.  Latest lactate reviewed. Normal.     Antibiotics (From admission, onward)      None          - General Surgery consulted as well as GI -- on zosyn, stool studies sent  - continue with CLD, pain is improving. Reports essentially no pain today, mild discomfort. WBC normalized.  - advance diet today          Initial (On Arrival)

## 2025-04-06 NOTE — NURSING
[facility] [place]  Nurses Note -- 4 Eyes      4/6/2025       Skin assessed on: Q Shift      [x] No Pressure Injuries Present    [x]Prevention Measures Documented    [] Yes LDA  for Pressure Injury Previously documented     [] Yes New Pressure Injury Discovered   [] LDA for New Pressure Injury Added      Attending RN:  Sangeeta Duran, RN     Second RN:  Olesya BunchRN

## 2025-04-06 NOTE — PLAN OF CARE
Case Management Final Discharge Note    Discharge Disposition: Home   New DME ordered / company name: None  Relevant SDOH / Transition of Care Barriers:  None  Person available to provide assistance at home when needed and their contact information: Spouse Raina [phone]     Scheduled followup appointment: PCP appointment scheduled for 4/24/2025 @ 9 am with Dr. Keyshawn Mitchell. Information added to AVS for pt to call to schedule a gastro follow up within one week.     Referrals placed: Gastroenterology     Transportation: Private Vehicle    Patient and family educated on discharge services and updated on DC plan. Bedside RN Libby notified, patient clear to discharge from Case Management Perspective.    04/06/25 1014   Final Note   Assessment Type Final Discharge Note   Anticipated Discharge Disposition Home   What phone number can be called within the next 1-3 days to see how you are doing after discharge? [phone]   Hospital Resources/Appts/Education Provided Appointments scheduled and added to AVS   Post-Acute Status   Coverage BLUE CROSS BLUE SHIELD - [employer] BLUE SAVER PPO - HD -   Discharge Delays None known at this time

## 2025-04-07 LAB
CALPROTECTIN INTERPRETATION (OHS): NORMAL
CALPROTECTIN, STOOL (OHS): >800

## 2025-04-08 ENCOUNTER — PATIENT OUTREACH (OUTPATIENT)
Facility: OTHER | Age: 35
End: 2025-04-08
Payer: COMMERCIAL

## 2025-04-08 LAB
BACTERIA BLD CULT: NORMAL
BACTERIA BLD CULT: NORMAL

## 2025-04-08 NOTE — PROGRESS NOTES
Patient was discharged from [facility] on 4/6/25. The discharge summary instructs the patient to follow up with their primary care provider, with an appointment already scheduled for 4/24 with Dr. Mitchell, as well as with GI. A GI referral has been placed. A follow-up call was made to assess for additional needs and to offer assistance with scheduling the GI appointment. There was no answer, and a voicemail was left requesting a return call.

## 2025-04-15 ENCOUNTER — TELEPHONE (OUTPATIENT)
Dept: ENDOSCOPY | Facility: HOSPITAL | Age: 35
End: 2025-04-15
Payer: COMMERCIAL

## 2025-04-15 DIAGNOSIS — D50.9 IRON DEFICIENCY ANEMIA, UNSPECIFIED IRON DEFICIENCY ANEMIA TYPE: ICD-10-CM

## 2025-04-15 DIAGNOSIS — R93.3 ABNORMAL FINDINGS ON DIAGNOSTIC IMAGING OF DIGESTIVE SYSTEM: ICD-10-CM

## 2025-04-15 DIAGNOSIS — Z12.11 SPECIAL SCREENING FOR MALIGNANT NEOPLASMS, COLON: Primary | ICD-10-CM

## 2025-04-15 NOTE — TELEPHONE ENCOUNTER
"---- Message from Antionette sent at 4/10/2025  8:18 AM CDT -----     ----- Message -----  From: Karyn Doll MD  Sent: 4/8/2025  12:31 PM CDT  To: [facility] Patients     Procedure: EGD/ColonoscopyDiagnosis: LILLIAN, abnormal imaging of TI Procedure Timing: Within 4 weeks (Urgent)#If within 4 weeks selected, please rebecca as high priority##If greater than 12 weeks, please select "5-12 weeks" and delay sending until 3 months prior to requested date# Location:  [facility] Additional Scheduling Information: No scheduling concernsPrep Specifications:Standard prepIs the patient taking a GLP-1 Agonist:noHave you attached a patient to this message: yes     "

## 2025-04-15 NOTE — TELEPHONE ENCOUNTER
Contacted the patient to schedule an endoscopy procedure(s) Colonoscopy/EGD . The patient did not answer the call and left a voice message requesting a call back.

## 2025-04-15 NOTE — TELEPHONE ENCOUNTER
"----- Message from Antionette sent at 4/10/2025  8:18 AM CDT -----    ----- Message -----  From: Karyn Doll MD  Sent: 4/8/2025  12:31 PM CDT  To: [facility] Patients    Procedure: EGD/ColonoscopyDiagnosis: LILLIAN, abnormal imaging of TI Procedure Timing: Within 4 weeks (Urgent)#If within 4 weeks selected, please rebecca as high priority##If greater than 12 weeks, please select "5-12 weeks" and delay sending until 3 months prior to requested date# Location:  [facility] Additional Scheduling Information: No scheduling concernsPrep Specifications:Standard prepIs the patient taking a GLP-1 Agonist:noHave you attached a patient to this message: yes  "

## 2025-04-15 NOTE — TELEPHONE ENCOUNTER
Referral for procedure from [facility]      Spoke to patient to schedule procedure(s) Colonoscopy/EGD       Physician to perform procedure(s) Dr. PADILLA Blanchard  Date of Procedure (s) 4/22/25  Arrival Time: 1:45 PM  Time of Procedure(s) 2:45 PM   Location of Procedure(s) [place] [address] Floor  Type of Rx Prep sent to patient: PEG  Instructions provided to patient via [facility]    Patient was informed on the following information and verbalized understanding. Screening questionnaire reviewed with patient and complete. If procedure requires anesthesia, a responsible adult needs to be present to accompany the patient home, patient cannot drive after receiving anesthesia. Appointment details are tentative, especially check-in time. Patient will receive a prep-op call 7 days prior to confirm check-in time for procedure. If applicable the patient should contact their pharmacy to verify Rx for procedure prep is ready for pick-up. Patient was advised to call the scheduling department at [phone] if pharmacy states no Rx is available. Patient was advised to call the endoscopy scheduling department if any questions or concerns arise.       Endoscopy Scheduling Department

## 2025-04-16 ENCOUNTER — TELEPHONE (OUTPATIENT)
Dept: GASTROENTEROLOGY | Facility: CLINIC | Age: 35
End: 2025-04-16
Payer: COMMERCIAL

## 2025-04-16 NOTE — TELEPHONE ENCOUNTER
----- Message from Tomy sent at 4/16/2025 12:16 PM CDT -----  Regarding: Cancel procedure  Contact: [phone]  Hi,Who called:The pt Reason:Called to cancel the procedure scheduled for 04/22/25. Provider's name:Dr. BlanchardAdditional Information:Thank you.

## 2025-04-17 ENCOUNTER — TELEPHONE (OUTPATIENT)
Dept: ENDOSCOPY | Facility: HOSPITAL | Age: 35
End: 2025-04-17
Payer: COMMERCIAL

## 2025-04-17 NOTE — TELEPHONE ENCOUNTER
Cirilo Mayberry MA routed this conversation to [facility] Endoscopy Schedulers  Cirilo Mayberry MA      4/16/25 12:52 PM  Note  See note        Cirilo Mayberry MA      4/16/25 12:52 PM  Note  ----- Message from Tomy sent at 4/16/2025 12:16 PM CDT -----  Regarding: Cancel procedure  Contact: [phone]  Hi,Who called:The pt Reason:Called to cancel the procedure scheduled for 04/22/25. Provider's name:Dr. BlanchardAdditional Information:Thank you.

## 2025-04-17 NOTE — TELEPHONE ENCOUNTER
Patient wanted to cancel his appointment. I will do that. Left a message for a call back just in case he wants to reschedule.

## 2025-04-21 ENCOUNTER — PATIENT MESSAGE (OUTPATIENT)
Dept: ENDOSCOPY | Facility: HOSPITAL | Age: 35
End: 2025-04-21
Payer: COMMERCIAL

## 2025-04-22 ENCOUNTER — PATIENT OUTREACH (OUTPATIENT)
Facility: OTHER | Age: 35
End: 2025-04-22
Payer: COMMERCIAL

## 2025-04-22 ENCOUNTER — TELEPHONE (OUTPATIENT)
Dept: ENDOSCOPY | Facility: HOSPITAL | Age: 35
End: 2025-04-22
Payer: COMMERCIAL

## 2025-04-22 NOTE — TELEPHONE ENCOUNTER
Called patient to cancel and reschedule EGD/Colonoscopy.  Left message that I would cancel his appointment today and to please call back when ready to reschedule.  Main scheduling number left on message.

## 2025-04-22 NOTE — TELEPHONE ENCOUNTER
----- Message from Tomy sent at 4/16/2025 12:16 PM CDT -----  Regarding: Cancel procedure  Contact: [phone]  Hi,Who called:The pt Reason:Called to cancel the procedure scheduled for 04/22/25. Provider's name:Dr. BlanchardAdditional Information:Thank you.

## 2025-04-22 NOTE — PROGRESS NOTES
Patient was scheduled for an appointment reminder today, but the appointment was canceled. A follow-up call was placed to assess additional needs and offer scheduling assistance, but there was no answer. A voicemail was left requesting a return call. Assistance will be provided as needed if the patient returns the call.

## 2025-06-20 ENCOUNTER — TELEPHONE (OUTPATIENT)
Dept: FAMILY MEDICINE | Facility: CLINIC | Age: 35
End: 2025-06-20
Payer: COMMERCIAL

## 2025-06-20 DIAGNOSIS — Z00.00 NORMAL PHYSICAL EXAM: Primary | ICD-10-CM

## 2025-06-20 DIAGNOSIS — F41.9 ANXIETY: ICD-10-CM

## 2025-06-20 RX ORDER — SERTRALINE HYDROCHLORIDE 50 MG/1
50 TABLET, FILM COATED ORAL
Qty: 30 TABLET | Refills: 0 | Status: SHIPPED | OUTPATIENT
Start: 2025-06-20

## 2025-06-20 NOTE — TELEPHONE ENCOUNTER
Care Due:                  Date            Visit Type   Department     Provider  --------------------------------------------------------------------------------                                ESTABLISHED   [facility]                              PATIENT -    / INTERNAL MED  Last Visit: 05-      LI      / BARNEY Mitchell  Next Visit: None Scheduled  None         None Found                                                            Last  Test          Frequency    Reason                     Performed    Due Date  --------------------------------------------------------------------------------    Office Visit  15 months..  sertraline...............  05- 08-    [facility] Embedded Care Due Messages. Reference number: [id number].   6/20/2025 12:14:08 AM CDT

## 2025-06-20 NOTE — TELEPHONE ENCOUNTER
I left a detailed voice mail for the patient to call and schedule a lab appointment and an appointment with Dr. Mitchell to continue receiving medication refills.

## 2025-06-24 ENCOUNTER — PATIENT OUTREACH (OUTPATIENT)
Dept: ADMINISTRATIVE | Facility: HOSPITAL | Age: 35
End: 2025-06-24
Payer: COMMERCIAL

## 2025-06-24 ENCOUNTER — LAB VISIT (OUTPATIENT)
Dept: LAB | Facility: HOSPITAL | Age: 35
End: 2025-06-24
Attending: INTERNAL MEDICINE
Payer: COMMERCIAL

## 2025-06-24 DIAGNOSIS — Z00.00 NORMAL PHYSICAL EXAM: ICD-10-CM

## 2025-06-24 LAB
ALBUMIN SERPL BCP-MCNC: 4.5 G/DL (ref 3.5–5.2)
ALP SERPL-CCNC: 92 UNIT/L (ref 40–150)
ALT SERPL W/O P-5'-P-CCNC: 37 UNIT/L (ref 10–44)
ANION GAP (OHS): 11 MMOL/L (ref 8–16)
AST SERPL-CCNC: 22 UNIT/L (ref 11–45)
BILIRUB SERPL-MCNC: 0.4 MG/DL (ref 0.1–1)
BUN SERPL-MCNC: 18 MG/DL (ref 6–20)
CALCIUM SERPL-MCNC: 9.3 MG/DL (ref 8.7–10.5)
CHLORIDE SERPL-SCNC: 106 MMOL/L (ref 95–110)
CHOLEST SERPL-MCNC: 215 MG/DL (ref 120–199)
CHOLEST/HDLC SERPL: 5.5 {RATIO} (ref 2–5)
CO2 SERPL-SCNC: 21 MMOL/L (ref 23–29)
CREAT SERPL-MCNC: 1.1 MG/DL (ref 0.5–1.4)
ERYTHROCYTE [DISTWIDTH] IN BLOOD BY AUTOMATED COUNT: 13.8 % (ref 11.5–14.5)
GFR SERPLBLD CREATININE-BSD FMLA CKD-EPI: >60 ML/MIN/1.73/M2
GLUCOSE SERPL-MCNC: 91 MG/DL (ref 70–110)
HCT VFR BLD AUTO: 48.9 % (ref 40–54)
HDLC SERPL-MCNC: 39 MG/DL (ref 40–75)
HDLC SERPL: 18.1 % (ref 20–50)
HGB BLD-MCNC: 15.8 GM/DL (ref 14–18)
LDLC SERPL CALC-MCNC: 96.8 MG/DL (ref 63–159)
MCH RBC QN AUTO: 28 PG (ref 27–31)
MCHC RBC AUTO-ENTMCNC: 32.3 G/DL (ref 32–36)
MCV RBC AUTO: 87 FL (ref 82–98)
NONHDLC SERPL-MCNC: 176 MG/DL
PLATELET # BLD AUTO: 345 K/UL (ref 150–450)
PMV BLD AUTO: 9.3 FL (ref 9.2–12.9)
POTASSIUM SERPL-SCNC: 3.8 MMOL/L (ref 3.5–5.1)
PROT SERPL-MCNC: 7.9 GM/DL (ref 6–8.4)
RBC # BLD AUTO: 5.65 M/UL (ref 4.6–6.2)
SODIUM SERPL-SCNC: 138 MMOL/L (ref 136–145)
TRIGL SERPL-MCNC: 396 MG/DL (ref 30–150)
WBC # BLD AUTO: 10.41 K/UL (ref 3.9–12.7)

## 2025-06-24 PROCEDURE — 36415 COLL VENOUS BLD VENIPUNCTURE: CPT | Mod: PO

## 2025-06-24 PROCEDURE — 85027 COMPLETE CBC AUTOMATED: CPT

## 2025-06-24 PROCEDURE — 80061 LIPID PANEL: CPT

## 2025-06-24 PROCEDURE — 80053 COMPREHEN METABOLIC PANEL: CPT

## 2025-07-07 NOTE — PROGRESS NOTES
This note was created by combination of typed  and M-Modal dictation.  Transcription errors may be present.   This note was also generated with the assistance of ambient listening technology. Verbal consent was obtained by the patient and accompanying visitor(s) for the recording of patient appointment to facilitate this note. I attest to having reviewed and edited the generated note for accuracy, though some syntax or spelling errors may persist. Please contact the author of this note for any clarification.    Assessment and Plan:   Assessment and Plan   Assessment & Plan  Normal physical exam  Pre visit labs reviewed   Colonoscopy ordered see below  Has made some modifications with diet.  He declines COVID vaccination  Orders:    CBC Without Differential; Future    Comprehensive Metabolic Panel; Future    Lipid Panel; Future    Hemoglobin A1C; Future    Anxiety  Stable on low-dose sertraline.  Finds it effective.  Wants to continue.  Update prescription  Orders:    sertraline (ZOLOFT) 50 MG tablet; Take 1 tablet (50 mg total) by mouth once daily.    Colitis  Terminal ileitis without complication  Hospitalization for right lower quadrant pain, was concerning for appendicitis but determined not to be appendicitis but colitis.  Was scheduled for colonoscopy but had high co-pay.  He is not sure if that was an anomaly with his insurance or if he has a high co-pay or what.    But he still gets twinges of pain in the right lower quadrant area and it is concerning to him and would be agreeable to proceed with colonoscopy.  Ordered  Orders:    Ambulatory referral/consult to Endo Procedure ; Future    Dyslipidemia  Working on diet and physical activity modification.  Triglycerides significantly increased compared to previous but total cholesterol did significantly drop.  Work on TLC.  Discussed that depending on his trends he may benefit from statin therapy in the future         Medications Discontinued  During This Encounter   Medication Reason    sertraline (ZOLOFT) 50 MG tablet Reorder       meds sent this encounter:  Medications Ordered This Encounter   Medications    sertraline (ZOLOFT) 50 MG tablet     Sig: Take 1 tablet (50 mg total) by mouth once daily.     Dispense:  90 tablet     Refill:  3     Pharmacy update refills, keep on file, not requesting Rx to be filled today.         Follow Up:  Physical exam 1 year with labs  Future Appointments   Date Time Provider Department Center   2025  9:45 AM PRE-ADMIT NURSE 5, ENDO -[facility] ENDOPP4 [facility]   2026  3:40 PM Keyshawn Mitchell MD [facility]            Subjective:   Subjective   Chief Complaint   Patient presents with    Annual Exam       HPI  Mao is a 34 y.o. male.     Social History     Socioeconomic History    Marital status:    Occupational History    Occupation: to start with [employer]; currently [employer] x 2024   Tobacco Use    Smoking status: Former     Current packs/day: 0.00     Average packs/day: 0.3 packs/day for 12.7 years (3.8 ttl pk-yrs)     Types: Cigarettes     Start date:      Quit date: 10/2022     Years since quittin.7    Smokeless tobacco: Never   Substance and Sexual Activity    Alcohol use: Not Currently     Alcohol/week: 3.0 standard drinks of alcohol     Types: 3 Cans of beer per week    Drug use: Never    Sexual activity: Yes     Partners: Female     Birth control/protection: None   Social History Narrative    , 3 YO son (as of 2024)     Social Drivers of Health     Financial Resource Strain: Low Risk  (2025)    Overall Financial Resource Strain (CARDIA)     Difficulty of Paying Living Expenses: Not hard at all   Food Insecurity: Patient Declined (2025)    Hunger Vital Sign     Worried About Running Out of Food in the Last Year: Patient declined     Ran Out of Food in the Last Year: Patient declined   Transportation Needs:  Patient Declined (7/1/2025)    PRAPARE - Transportation     Lack of Transportation (Medical): Patient declined     Lack of Transportation (Non-Medical): Patient declined   Physical Activity: Inactive (7/1/2025)    Exercise Vital Sign     Days of Exercise per Week: 5 days     Minutes of Exercise per Session: 0 min   Stress: Patient Declined (7/1/2025)    [employer] [place] [employer] - Occupational Stress Questionnaire     Feeling of Stress : Patient declined   Housing Stability: Patient Declined (7/1/2025)    Housing Stability Vital Sign     Unable to Pay for Housing in the Last Year: Patient declined     Homeless in the Last Year: Patient declined       Last appointment with this clinic was Visit date not found. Last visit with me 5/16/2024   To summarize last visit and events leading up to today:  Anxiety trial sertraline  Symptoms - over thinking things.  Hard to fall asleep due to that.  Causing dry heaving in the mornings  Wife had mentioned counseling but he had never though much about it.     Lipid high no previous for comparison. LDL just under 190, nonHDL 207. Not on statin  CMP WNL  CBC WNL  TSH WNL  A1c WNL  HIV, HCV negative  Results to pt. Work on therapeutic lifestyle modification (TLC). OV 1 month on SSRI    Last visit me 05/16/2024   Anxiety improved on sertraline increase to 50 mg    4/3/25 admit for abd pain, CT suspicious for appy, seen by surgery, not appy  but colitis, abx, needs GI FOLLOW UP    Was scheduled for EGD and colonoscopy 04/22/2025, canceled    Pre visit labs   CBC normal   CMP normal   Lipid triglycerides high HDL low non     Needs colonoscopy    Today's visit:    History of Present Illness    SOCIAL HISTORY:  - Employed, works in the heat    Marital status:     HPI:  Mao reports occasional mild discomfort in the right lower abdominal area, persisting after hospitalization earlier this year for severe abdominal pain, initially suspected to be appendicitis  but later diagnosed as colitis. His appetite has returned to normal, and he is able to eat at restaurants again. The lingering discomfort causes him concern.    He was previously scheduled for a colonoscopy following his hospitalization but postponed it. He wishes to reschedule the procedure to rule out any serious conditions.    He mentions high cholesterol levels in recent labs, which concerned his wife. He reports making dietary changes, including reducing soda consumption by 95% and improving his food choices, particularly during the day. He admits to less healthy eating habits in the evenings.    He denies heartburn, chest pain, shortness of breath, cough, diarrhea, or constipation. He rarely has headaches, usually related to his eyes.    MEDICATIONS:  - Zoloft, low dosage, for depression/mood stabilization, patient reports it is effective with no side effects    FAMILY HISTORY:  - Grandfather (maternal):  at age 42 from an unspecified form of cancer      ROS:  Eyes: reports eye strain  Cardiovascular: no chest pain  Respiratory: no cough, no shortness of breath  Gastrointestinal: reports abdominal pain, reports stool texture change  Neurological: reports headache  Psychiatric: reports depression         Patient Care Team:  Keyshawn Mitchell MD as PCP - General (Internal Medicine)  Samia Dickson LPN as Licensed Practical Nurse  Rayshawn Padilla LPN (Inactive) as Licensed Practical Nurse    Patient Active Problem List    Diagnosis Date Noted    Terminal ileitis 2025    Sepsis without acute organ dysfunction 2025    Anxiety 04/10/2024       PAST MEDICAL PROBLEMS, PAST SURGICAL HISTORY: please see relevant portions of the electronic medical record    ALLERGIES AND MEDICATIONS: updated and reviewed.  Medication List with Changes/Refills   Current Medications    SERTRALINE (ZOLOFT) 50 MG TABLET    TAKE 1 TABLET BY MOUTH EVERY DAY         Objective:   Objective   Physical Exam   Vitals:     "07/08/25 1534   BP: 132/84   Pulse: 100   Temp: 98.7 °F (37.1 °C)   TempSrc: Oral   SpO2: 98%   Weight: 98 kg (215 lb 15.1 oz)   Height: 5' 11" (1.803 m)    Body mass index is 30.12 kg/m².  Weight: 98 kg (215 lb 15.1 oz)   Height: 5' 11" (180.3 cm)     Physical Exam  Constitutional:       Appearance: Normal appearance. He is well-developed.   HENT:      Right Ear: Tympanic membrane and external ear normal.      Left Ear: Tympanic membrane and external ear normal.      Nose: Nose normal.   Eyes:      General: No scleral icterus.     Conjunctiva/sclera: Conjunctivae normal.   Neck:      Thyroid: No thyroid mass or thyromegaly.      Trachea: Trachea normal.   Cardiovascular:      Rate and Rhythm: Normal rate and regular rhythm.      Heart sounds: Normal heart sounds, S1 normal and S2 normal. No murmur heard.  Pulmonary:      Effort: Pulmonary effort is normal.      Breath sounds: Normal breath sounds.   Abdominal:      General: There is no distension.      Palpations: Abdomen is soft. There is no hepatomegaly, splenomegaly or mass.      Tenderness: There is no abdominal tenderness.          Comments: Pointing towards the right lower quadrant as area of pain but no tenderness on palpation   Musculoskeletal:         General: No deformity.   Lymphadenopathy:      Cervical: No cervical adenopathy.   Skin:     General: Skin is warm and dry.      Findings: No rash (on exposed skin).      Comments: On exposed skin   Neurological:      Mental Status: He is alert and oriented to person, place, and time.      Cranial Nerves: No cranial nerve deficit.      Sensory: No sensory deficit.      Deep Tendon Reflexes: Reflexes are normal and symmetric.   Psychiatric:         Speech: Speech normal.         Behavior: Behavior normal.         Thought Content: Thought content normal.         Judgment: Judgment normal.       Component      Latest Ref Rng 4/16/2024 6/24/2025   Sodium      136 - 145 mmol/L  138    Potassium      3.5 - 5.1 " mmol/L  3.8    Chloride      95 - 110 mmol/L  106    CO2      23 - 29 mmol/L  21 (L)    Glucose      70 - 110 mg/dL  91    BUN      6 - 20 mg/dL  18    Creatinine      0.5 - 1.4 mg/dL  1.1    Calcium      8.7 - 10.5 mg/dL  9.3    PROTEIN TOTAL      6.0 - 8.4 gm/dL  7.9    Albumin      3.5 - 5.2 g/dL  4.5    BILIRUBIN TOTAL      0.1 - 1.0 mg/dL  0.4    ALP      40 - 150 unit/L  92    AST      11 - 45 unit/L  22    ALT      10 - 44 unit/L  37    Anion Gap      8 - 16 mmol/L  11    eGFR      >60 mL/min/1.73/m2  >60    WBC      3.90 - 12.70 K/uL  10.41    RBC      4.60 - 6.20 M/uL  5.65    Hemoglobin      14.0 - 18.0 gm/dL  15.8    Hematocrit      40.0 - 54.0 %  48.9    MCV      82 - 98 fL  87    MCHC      32.0 - 36.0 g/dL  32.3    RDW      11.5 - 14.5 %  13.8    Platelet Count      150 - 450 K/uL  345    MCH      27.0 - 31.0 pg  28.0    MPV      9.2 - 12.9 fL  9.3    Cholesterol Total      120 - 199 mg/dL 250 (H)  215 (H)    Triglycerides      30 - 150 mg/dL 99  396 (H)    HDL      40 - 75 mg/dL 43  39 (L)    LDL Cholesterol      63.0 - 159.0 mg/dL 187.2 (H)  96.8    HDL/Cholesterol Ratio      20.0 - 50.0 % 17.2 (L)  18.1 (L)    Total Cholesterol/HDL Ratio      2.0 - 5.0  5.8 (H)  5.5 (H)    Non-HDL Cholesterol      mg/dL 207  176    Hemoglobin A1C External      4.0 - 5.6 % 5.2     Estimated Avg Glucose      68 - 131 mg/dL 103        Legend:  (H) High  (L) Low

## 2025-07-07 NOTE — ASSESSMENT & PLAN NOTE
Hospitalization for right lower quadrant pain, was concerning for appendicitis but determined not to be appendicitis but colitis.  Was scheduled for colonoscopy but had high co-pay.  He is not sure if that was an anomaly with his insurance or if he has a high co-pay or what.    But he still gets twinges of pain in the right lower quadrant area and it is concerning to him and would be agreeable to proceed with colonoscopy.  Ordered  Orders:    Ambulatory referral/consult to Endo Procedure ; Future

## 2025-07-07 NOTE — ASSESSMENT & PLAN NOTE
Stable on low-dose sertraline.  Finds it effective.  Wants to continue.  Update prescription  Orders:    sertraline (ZOLOFT) 50 MG tablet; Take 1 tablet (50 mg total) by mouth once daily.

## 2025-07-08 ENCOUNTER — OFFICE VISIT (OUTPATIENT)
Dept: FAMILY MEDICINE | Facility: CLINIC | Age: 35
End: 2025-07-08
Payer: COMMERCIAL

## 2025-07-08 VITALS
DIASTOLIC BLOOD PRESSURE: 84 MMHG | OXYGEN SATURATION: 98 % | SYSTOLIC BLOOD PRESSURE: 132 MMHG | HEART RATE: 100 BPM | HEIGHT: 71 IN | BODY MASS INDEX: 30.23 KG/M2 | WEIGHT: 215.94 LBS | TEMPERATURE: 99 F

## 2025-07-08 DIAGNOSIS — K50.00 TERMINAL ILEITIS WITHOUT COMPLICATION: ICD-10-CM

## 2025-07-08 DIAGNOSIS — K52.9 COLITIS: ICD-10-CM

## 2025-07-08 DIAGNOSIS — Z00.00 NORMAL PHYSICAL EXAM: Primary | ICD-10-CM

## 2025-07-08 DIAGNOSIS — F41.9 ANXIETY: ICD-10-CM

## 2025-07-08 DIAGNOSIS — E78.5 DYSLIPIDEMIA: ICD-10-CM

## 2025-07-08 PROCEDURE — 3079F DIAST BP 80-89 MM HG: CPT | Mod: CPTII,S$GLB,, | Performed by: INTERNAL MEDICINE

## 2025-07-08 PROCEDURE — 3008F BODY MASS INDEX DOCD: CPT | Mod: CPTII,S$GLB,, | Performed by: INTERNAL MEDICINE

## 2025-07-08 PROCEDURE — 99395 PREV VISIT EST AGE 18-39: CPT | Mod: S$GLB,,, | Performed by: INTERNAL MEDICINE

## 2025-07-08 PROCEDURE — 3075F SYST BP GE 130 - 139MM HG: CPT | Mod: CPTII,S$GLB,, | Performed by: INTERNAL MEDICINE

## 2025-07-08 PROCEDURE — 1160F RVW MEDS BY RX/DR IN RCRD: CPT | Mod: CPTII,S$GLB,, | Performed by: INTERNAL MEDICINE

## 2025-07-08 PROCEDURE — 1159F MED LIST DOCD IN RCRD: CPT | Mod: CPTII,S$GLB,, | Performed by: INTERNAL MEDICINE

## 2025-07-08 PROCEDURE — 99999 PR PBB SHADOW E&M-EST. PATIENT-LVL IV: CPT | Mod: PBBFAC,,, | Performed by: INTERNAL MEDICINE

## 2025-07-08 RX ORDER — SERTRALINE HYDROCHLORIDE 50 MG/1
50 TABLET, FILM COATED ORAL DAILY
Qty: 90 TABLET | Refills: 3 | Status: SHIPPED | OUTPATIENT
Start: 2025-07-08

## 2025-07-09 ENCOUNTER — CLINICAL SUPPORT (OUTPATIENT)
Dept: ENDOSCOPY | Facility: HOSPITAL | Age: 35
End: 2025-07-09
Attending: INTERNAL MEDICINE
Payer: COMMERCIAL

## 2025-07-09 ENCOUNTER — TELEPHONE (OUTPATIENT)
Dept: ENDOSCOPY | Facility: HOSPITAL | Age: 35
End: 2025-07-09

## 2025-07-09 VITALS — BODY MASS INDEX: 30.1 KG/M2 | WEIGHT: 215 LBS | HEIGHT: 71 IN

## 2025-07-09 DIAGNOSIS — Z12.11 COLON CANCER SCREENING: ICD-10-CM

## 2025-07-09 DIAGNOSIS — K52.9 COLITIS: Primary | ICD-10-CM

## 2025-07-09 DIAGNOSIS — K50.00 TERMINAL ILEITIS WITHOUT COMPLICATION: ICD-10-CM

## 2025-07-09 RX ORDER — POLYETHYLENE GLYCOL 3350, SODIUM SULFATE ANHYDROUS, SODIUM BICARBONATE, SODIUM CHLORIDE, POTASSIUM CHLORIDE 236; 22.74; 6.74; 5.86; 2.97 G/4L; G/4L; G/4L; G/4L; G/4L
4 POWDER, FOR SOLUTION ORAL ONCE
Qty: 4000 ML | Refills: 0 | Status: SHIPPED | OUTPATIENT
Start: 2025-07-09 | End: 2025-07-09

## 2025-07-09 NOTE — PLAN OF CARE
.Patient is scheduled for a Colonoscopy on 7/15/2025 with Dr. SERGIO Bender  Referral for procedure from PAT appointment

## 2025-07-13 ENCOUNTER — PATIENT MESSAGE (OUTPATIENT)
Dept: HEMATOLOGY/ONCOLOGY | Facility: CLINIC | Age: 35
End: 2025-07-13
Payer: COMMERCIAL

## 2025-08-05 ENCOUNTER — PATIENT MESSAGE (OUTPATIENT)
Dept: ENDOSCOPY | Facility: HOSPITAL | Age: 35
End: 2025-08-05
Payer: COMMERCIAL